# Patient Record
Sex: FEMALE | Race: WHITE | Employment: OTHER | URBAN - METROPOLITAN AREA
[De-identification: names, ages, dates, MRNs, and addresses within clinical notes are randomized per-mention and may not be internally consistent; named-entity substitution may affect disease eponyms.]

---

## 2017-03-01 DIAGNOSIS — J45.20 MILD INTERMITTENT ASTHMA WITHOUT COMPLICATION: ICD-10-CM

## 2017-03-02 NOTE — TELEPHONE ENCOUNTER
Spiriva   Last Written Prescription Date: 8/4/2016  Last Fill Quantity: 3 # refills:   Last Office Visit with [facility], [facility] or [facility] prescribing provider:  8/4/2016   Future Office Visit:       Date of Last Asthma Action Plan Letter:   Asthma Action Plan Q1 Year    Topic Date Due     Asthma Action Plan - yearly  05/08/2015      Asthma Control Test:   ACT Total Scores 8/5/2016   ACT TOTAL SCORE -   ASTHMA ER VISITS -   ASTHMA HOSPITALIZATIONS -   ACT TOTAL SCORE (Goal Greater than or Equal to 20) 24   In the past 12 months, how many times did you visit the emergency room for your asthma without being admitted to the hospital? 0   In the past 12 months, how many times were you hospitalized overnight because of your asthma? 0       Date of Last Spirometry Test:   No results found for this or any previous visit.

## 2017-03-03 RX ORDER — TIOTROPIUM BROMIDE 18 UG/1
CAPSULE ORAL; RESPIRATORY (INHALATION)
Qty: 90 CAPSULE | Refills: 0 | Status: SHIPPED | OUTPATIENT
Start: 2017-03-03 | End: 2017-05-09

## 2017-03-03 NOTE — TELEPHONE ENCOUNTER
Medication is being filled for 1 time refill only due to:  Patient needs to be seen because due for asthma f/u.  .   Reminder note sent to pharmacy    Santa Mchugh RN

## 2017-05-09 DIAGNOSIS — J45.20 MILD INTERMITTENT ASTHMA WITHOUT COMPLICATION: ICD-10-CM

## 2017-05-09 RX ORDER — TIOTROPIUM BROMIDE 18 UG/1
CAPSULE ORAL; RESPIRATORY (INHALATION)
Qty: 30 CAPSULE | Refills: 0 | Status: SHIPPED | OUTPATIENT
Start: 2017-05-09 | End: 2017-06-29

## 2017-05-09 NOTE — TELEPHONE ENCOUNTER
SPIRIVA 18 MCG CP-HANDIHALER       Last Written Prescription Date: 3/3/17  Last Fill Quantity: 90, # refills: 0    Last Office Visit with G, P or [facility] prescribing provider:  8/5/16   Future Office Visit:       Date of Last Asthma Action Plan Letter:   Asthma Action Plan Q1 Year    Topic Date Due     Asthma Action Plan - yearly  05/08/2015      Asthma Control Test:   ACT Total Scores 8/5/2016   ACT TOTAL SCORE -   ASTHMA ER VISITS -   ASTHMA HOSPITALIZATIONS -   ACT TOTAL SCORE (Goal Greater than or Equal to 20) 24   In the past 12 months, how many times did you visit the emergency room for your asthma without being admitted to the hospital? 0   In the past 12 months, how many times were you hospitalized overnight because of your asthma? 0       Date of Last Spirometry Test:   No results found for this or any previous visit.    Patient is going to [place] and won't be back until [place] 10.  Will need 30 days supply for coverage until return.  Will schedule an appt when she return to [place].   30 days supply sent as requested.    Santa Mchugh RN

## 2017-06-29 ENCOUNTER — OFFICE VISIT (OUTPATIENT)
Dept: FAMILY MEDICINE | Facility: CLINIC | Age: 82
End: 2017-06-29
Payer: COMMERCIAL

## 2017-06-29 VITALS
HEART RATE: 68 BPM | BODY MASS INDEX: 25.49 KG/M2 | HEIGHT: 61 IN | WEIGHT: 135 LBS | TEMPERATURE: 97.8 F | OXYGEN SATURATION: 98 % | SYSTOLIC BLOOD PRESSURE: 124 MMHG | RESPIRATION RATE: 16 BRPM | DIASTOLIC BLOOD PRESSURE: 70 MMHG

## 2017-06-29 DIAGNOSIS — M81.0 OSTEOPOROSIS, UNSPECIFIED OSTEOPOROSIS TYPE, UNSPECIFIED PATHOLOGICAL FRACTURE PRESENCE: ICD-10-CM

## 2017-06-29 DIAGNOSIS — I73.00 RAYNAUD'S DISEASE WITHOUT GANGRENE: ICD-10-CM

## 2017-06-29 DIAGNOSIS — J45.20 MILD INTERMITTENT ASTHMA WITHOUT COMPLICATION: Primary | ICD-10-CM

## 2017-06-29 PROCEDURE — 99213 OFFICE O/P EST LOW 20 MIN: CPT | Performed by: PHYSICIAN ASSISTANT

## 2017-06-29 RX ORDER — FLUTICASONE PROPIONATE 110 UG/1
2 AEROSOL, METERED RESPIRATORY (INHALATION) 2 TIMES DAILY
Qty: 3 INHALER | Refills: 1 | Status: SHIPPED | OUTPATIENT
Start: 2017-06-29 | End: 2017-10-05

## 2017-06-29 RX ORDER — TIOTROPIUM BROMIDE 18 UG/1
CAPSULE ORAL; RESPIRATORY (INHALATION)
Qty: 90 CAPSULE | Refills: 1 | Status: SHIPPED | OUTPATIENT
Start: 2017-06-29 | End: 2017-10-05

## 2017-06-29 RX ORDER — ALBUTEROL SULFATE 90 UG/1
2 AEROSOL, METERED RESPIRATORY (INHALATION) 4 TIMES DAILY PRN
Qty: 3 INHALER | Refills: 1 | Status: SHIPPED | OUTPATIENT
Start: 2017-06-29 | End: 2018-10-22

## 2017-06-29 RX ORDER — AMLODIPINE BESYLATE 2.5 MG/1
2.5 TABLET ORAL DAILY
Qty: 90 TABLET | Refills: 3 | Status: CANCELLED | OUTPATIENT
Start: 2017-06-29

## 2017-06-29 NOTE — PROGRESS NOTES
"Chief Complaint   Patient presents with     Recheck Medication     Asthma       Initial /70  Pulse 68  Temp 97.8  F (36.6  C)  Resp 16  Ht 5' 1\" (1.549 m)  Wt 135 lb (61.2 kg)  SpO2 98%  BMI 25.51 kg/m2 Estimated body mass index is 25.51 kg/(m^2) as calculated from the following:    Height as of this encounter: 5' 1\" (1.549 m).    Weight as of this encounter: 135 lb (61.2 kg).  Medication Reconciliation: complete. MEÑO Talbert LPN        SUBJECTIVE:                                                    Kylah Carrasquillo is a 81 year old female who presents to clinic today for the following health issues:      Asthma Follow-Up    Was ACT completed today?    Yes    ACT Total Scores 8/5/2016   ACT TOTAL SCORE -   ASTHMA ER VISITS -   ASTHMA HOSPITALIZATIONS -   ACT TOTAL SCORE (Goal Greater than or Equal to 20) 24   In the past 12 months, how many times did you visit the emergency room for your asthma without being admitted to the hospital? 0   In the past 12 months, how many times were you hospitalized overnight because of your asthma? 0         Asthma stable, requesting refills.     Has been traveling a lot - [place], [place], [place].       -------------------------------------    Problem list and histories reviewed & adjusted, as indicated.  Additional history: as documented    Patient Active Problem List   Diagnosis     Mild intermittent asthma     Cardiovascular disease     Raynaud's syndrome     Advanced directives, counseling/discussion     Osteoporosis     Hyperlipidemia LDL goal <100     First degree AV block     Cataracta     Hallux valgus, acquired     Arthritis of right foot     S/P hysterectomy     Overweight (BMI 25.0-29.9)     Past Surgical History:   Procedure Laterality Date     CYSTOCELE REPAIR  1970's     HEMORRHOIDECTOMY  1970's     HERNIA REPAIR, UMBILICAL  1970's     HYSTERECTOMY, VAGINAL  1988    menorrhagia     PHACOEMULSIFICATION CLEAR CORNEA WITH STANDARD INTRAOCULAR LENS IMPLANT  " 8/7/2014    Procedure: PHACOEMULSIFICATION CLEAR CORNEA WITH STANDARD INTRAOCULAR LENS IMPLANT;  Surgeon: Austin Garcia MD;  Location:  [facility]     PHACOEMULSIFICATION CLEAR CORNEA WITH STANDARD INTRAOCULAR LENS IMPLANT Left 9/25/2014    Procedure: PHACOEMULSIFICATION CLEAR CORNEA WITH STANDARD INTRAOCULAR LENS IMPLANT;  Surgeon: Austin Garcia MD;  Location:  [facility]     TONSILLECTOMY  1945     TUBAL LIGATION  1970's       Social History   Substance Use Topics     Smoking status: Former Smoker     Quit date: 11/15/1990     Smokeless tobacco: Never Used     Alcohol use 0.0 oz/week     0 Standard drinks or equivalent per week      Comment: occassional     Family History   Problem Relation Age of Onset     Asthma Mother      OSTEOPOROSIS Mother      C.A.D. Father      DIABETES Daughter      Thyroid Disease Daughter      C.A.D. Brother      C.A.D. Brother      Thyroid Disease Sister      DIABETES Brother      Hypertension No family hx of      Breast Cancer No family hx of      Cancer - colorectal No family hx of      Anesthesia Reaction No family hx of      Blood Disease No family hx of      Eye Disorder No family hx of          Current Outpatient Prescriptions   Medication Sig Dispense Refill     SPIRIVA HANDIHALER 18 MCG capsule INHALE THE CONTENTS OF 1 CAPSULE DAILY.NEEDS APPT FOR FURTHER REILLS 30 capsule 0     fluticasone (FLOVENT HFA) 110 MCG/ACT inhaler Inhale 2 puffs into the lungs 2 times daily 3 Inhaler 1     amLODIPine (NORVASC) 2.5 MG tablet Take 1 tablet (2.5 mg) by mouth daily 90 tablet 3     albuterol (VENTOLIN HFA) 108 (90 BASE) MCG/ACT inhaler Inhale 2 puffs into the lungs 4 times daily as needed 3 Inhaler 1     Omega-3 Fatty Acids (OMEGA-3 FISH OIL PO)        Multiple Vitamins-Minerals (MULTIVITAMIN PO)        Calcium Carbonate-Vitamin D (CALCIUM + D PO) Take 2 tablets by mouth daily.  0     Allergies   Allergen Reactions     Benadryl [Diphenhydramine Hcl]      Morphine      Penicillins   "    Labs reviewed in EPIC    Reviewed and updated as needed this visit by clinical staff  Tobacco  Allergies  Meds  Surg Hx  Fam Hx  Soc Hx      Reviewed and updated as needed this visit by Provider         Social History     Social History     Marital status: Single     Spouse name: N/A     Number of children: 7     Years of education: 12     Occupational History     Dentist's office      Banking      Social History Main Topics     Smoking status: Former Smoker     Quit date: 11/15/1990     Smokeless tobacco: Never Used     Alcohol use 0.0 oz/week     0 Standard drinks or equivalent per week      Comment: occassional     Drug use: No     Sexual activity: No     Other Topics Concern      Service No     Blood Transfusions No     Special Diet No     Back Care No     Exercise Yes     walking, not regularly; will start going to a health club     Seat Belt Yes     Self-Exams Yes     Parent/Sibling W/ Cabg, Mi Or Angioplasty Before 65f 55m? Yes     Social History Narrative    Eats fruits and vegetables every day. Calcium intake is good. Vitamin D intake is close.       10 point review of systems negative other than symptoms noted above.   Constitutional, HEENT, CV, pulmonary, GI, , MS, Endo, Psych systems are all negative, except as otherwise noted.       OBJECTIVE:  /70  Pulse 68  Temp 97.8  F (36.6  C)  Resp 16  Ht 5' 1\" (1.549 m)  Wt 135 lb (61.2 kg)  SpO2 98%  BMI 25.51 kg/m2  CONSTITUTIONAL: Alert, well-nourished, well-groomed, NAD  RESP: Lungs CTA. No wheeze, rhonchi, rales. Normal effort on room air. Equal lung sounds bilaterally.   CV: HRRR, normal S1, S2. No MRG. No peripheral edema.  DERM: No rashes or suspicious lesions  Head: Normocephalic, atraumatic.  Eyes: Conjunctiva clear, non icteric. PERRLA.  Ears: External ears and TMs normal BL.  Nose: Septum midline, nasal mucosa pink and moist. No discharge.  Mouth / Throat: Normal dentition.  No oral lesions. Pharynx non erythematous, " tonsils without hypertrophy.  Neck: Supple, no enlarged LN, trachea midline.      Diagnostic Tests:  ACT 22    ASSESSMENT/PLAN:  (J45.20) Mild intermittent asthma without complication  (primary encounter diagnosis)  Comment: stable. No concerns.   Px in August.   Plan: tiotropium (SPIRIVA HANDIHALER) 18 MCG capsule,        fluticasone (FLOVENT HFA) 110 MCG/ACT Inhaler,         albuterol (VENTOLIN HFA) 108 (90 BASE) MCG/ACT         Inhaler            (I73.00) Raynaud's disease without gangrene  Comment:   Plan: Refill norvasc in August.    Osteoporosis - declines further DEXA scans.      FOLLOW-UP: Routinely and sooner as needed.  The patient agrees with this assessment and plan and agrees to call or return to the clinic with any questions or concerns or if their condition worsens.    CAROLINE Duran, PA-C  [facility]

## 2017-06-29 NOTE — MR AVS SNAPSHOT
"              After Visit Summary   6/29/2017    Kylah Carrasquillo    MRN: [id number]           Patient Information     Date Of Birth          1935        Visit Information        Provider Department      6/29/2017 10:40 AM Rocio Mariano PA-C [facility] [place]        Today's Diagnoses     Mild intermittent asthma without complication    -  1    Raynaud's disease without gangrene        Osteoporosis, unspecified osteoporosis type, unspecified pathological fracture presence           Follow-ups after your visit        Follow-up notes from your care team     Return in about 2 months (around 8/29/2017) for Lab Work, Physical Exam, Medication Check.      Who to contact     If you have questions or need follow up information about today's clinic visit or your schedule please contact [facility] [place] directly at [phone].  Normal or non-critical lab and imaging results will be communicated to you by MyChart, letter or phone within 4 business days after the clinic has received the results. If you do not hear from us within 7 days, please contact the clinic through MyChart or phone. If you have a critical or abnormal lab result, we will notify you by phone as soon as possible.  Submit refill requests through Cloud Amenity or call your pharmacy and they will forward the refill request to us. Please allow 3 business days for your refill to be completed.          Additional Information About Your Visit        MyChart Information     Cloud Amenity lets you send messages to your doctor, view your test results, renew your prescriptions, schedule appointments and more. To sign up, go to [email or web address] . Click on \"Log in\" on the left side of the screen, which will take you to the Welcome page. Then click on \"Sign up Now\" on the right side of the page.     You will be asked to enter the access code listed below, as well as some personal information. Please follow the directions to create " "your username and password.     Your access code is: [email or web address]  Expires: 2017  2:31 PM     Your access code will  in 90 days. If you need help or a new code, please call your [facility] or [phone].        Care EveryWhere ID     This is your Care EveryWhere ID. This could be used by other organizations to access your [place] medical records  [id number]        Your Vitals Were     Pulse Temperature Respirations Height Pulse Oximetry BMI (Body Mass Index)    68 97.8  F (36.6  C) 16 5' 1\" (1.549 m) 98% 25.51 kg/m2       Blood Pressure from Last 3 Encounters:   17 124/70   16 124/72   02/15/16 138/70    Weight from Last 3 Encounters:   17 135 lb (61.2 kg)   16 135 lb (61.2 kg)   02/15/16 133 lb (60.3 kg)              Today, you had the following     No orders found for display         Today's Medication Changes          These changes are accurate as of: 17  2:31 PM.  If you have any questions, ask your nurse or doctor.               These medicines have changed or have updated prescriptions.        Dose/Directions    tiotropium 18 MCG capsule   Commonly known as:  SPIRIVA HANDIHALER   This may have changed:  See the new instructions.   Used for:  Mild intermittent asthma without complication   Changed by:  Rocio Mariano PA-C        INHALE THE CONTENTS OF 1 CAPSULE DAILY.   Quantity:  90 capsule   Refills:  1            Where to get your medicines      These medications were sent to [id number] [place], [place] - [address] [facility] [place]  [address] [address] [facility] [address]     Phone:  [phone]     albuterol 108 (90 BASE) MCG/ACT Inhaler    fluticasone 110 MCG/ACT Inhaler    tiotropium 18 MCG capsule                Primary Care Provider    None Specified       No primary provider on file.        Equal Access to Services     MARIA FERNANDA CORNELIUS AH: Shaila miguel Somichelle, waaxda luqadaha, qaybta kaalmarc deleon, erasmo vargas " mikeljohanna ulishae la'aan ah. So [facility] [phone].    ATENCIÓN: Si habla ginger, tiene a still disposición servicios gratuitos de asistencia lingüística. [place] [phone].    We comply with applicable federal civil rights laws and [place] laws. We do not discriminate on the basis of race, color, national origin, age, disability sex, sexual orientation or gender identity.            Thank you!     Thank you for choosing [facility]  for your [facility]. Our goal is always to provide you with excellent care. Hearing back from our patients is one way we can continue to improve our services. Please take a few minutes to complete the written survey that you may receive in the mail after your visit with us. Thank you!             Your Updated Medication List - Protect others around you: Learn how to safely use, store and throw away your medicines at www.disposemymeds.org.          This list is accurate as of: 6/29/17  2:31 PM.  Always use your most recent med list.                   Brand Name Dispense Instructions for use Diagnosis    albuterol 108 (90 BASE) MCG/ACT Inhaler    VENTOLIN HFA    3 Inhaler    Inhale 2 puffs into the lungs 4 times daily as needed    Mild intermittent asthma without complication       amLODIPine 2.5 MG tablet    NORVASC    90 tablet    Take 1 tablet (2.5 mg) by mouth daily    Raynaud's disease without gangrene       CALCIUM + D PO      Take 2 tablets by mouth daily.    Routine general medical examination at a health care facility       fluticasone 110 MCG/ACT Inhaler    FLOVENT HFA    3 Inhaler    Inhale 2 puffs into the lungs 2 times daily    Mild intermittent asthma without complication       MULTIVITAMIN PO           OMEGA-3 FISH OIL PO           tiotropium 18 MCG capsule    SPIRIVA HANDIHALER    90 capsule    INHALE THE CONTENTS OF 1 CAPSULE DAILY.    Mild intermittent asthma without complication

## 2017-06-30 ASSESSMENT — ASTHMA QUESTIONNAIRES: ACT_TOTALSCORE: 22

## 2017-09-26 DIAGNOSIS — I73.00 RAYNAUD'S DISEASE WITHOUT GANGRENE: ICD-10-CM

## 2017-09-26 RX ORDER — AMLODIPINE BESYLATE 2.5 MG/1
TABLET ORAL
Qty: 30 TABLET | Refills: 0 | Status: SHIPPED | OUTPATIENT
Start: 2017-09-26 | End: 2017-10-05

## 2017-09-26 NOTE — TELEPHONE ENCOUNTER
Norvasc      Last Written Prescription Date: 8/4/16  Last Fill Quantity: 90, # refills: 3    Last Office Visit with G, P or [facility] prescribing provider:  6/29/17   Future Office Visit:        BP Readings from Last 3 Encounters:   06/29/17 124/70   08/04/16 124/72   02/15/16 138/70     MEÑO Talbert LPN

## 2017-09-26 NOTE — TELEPHONE ENCOUNTER
Routing refill request to provider for review/approval because:  HTN not addressed in 1 year.   Mercedes Brooks RN - Triage  [facility]

## 2017-10-05 ENCOUNTER — OFFICE VISIT (OUTPATIENT)
Dept: FAMILY MEDICINE | Facility: CLINIC | Age: 82
End: 2017-10-05
Payer: COMMERCIAL

## 2017-10-05 VITALS
TEMPERATURE: 97.9 F | BODY MASS INDEX: 24.92 KG/M2 | DIASTOLIC BLOOD PRESSURE: 68 MMHG | RESPIRATION RATE: 16 BRPM | SYSTOLIC BLOOD PRESSURE: 128 MMHG | HEART RATE: 76 BPM | OXYGEN SATURATION: 99 % | HEIGHT: 61 IN | WEIGHT: 132 LBS

## 2017-10-05 DIAGNOSIS — I73.00 RAYNAUD'S DISEASE WITHOUT GANGRENE: ICD-10-CM

## 2017-10-05 DIAGNOSIS — Z00.00 LABORATORY EXAMINATION ORDERED AS PART OF A ROUTINE GENERAL MEDICAL EXAMINATION: ICD-10-CM

## 2017-10-05 DIAGNOSIS — Z00.00 ENCOUNTER FOR ROUTINE ADULT HEALTH EXAMINATION WITHOUT ABNORMAL FINDINGS: Primary | ICD-10-CM

## 2017-10-05 DIAGNOSIS — Z23 NEED FOR PROPHYLACTIC VACCINATION AND INOCULATION AGAINST INFLUENZA: ICD-10-CM

## 2017-10-05 DIAGNOSIS — J45.20 MILD INTERMITTENT ASTHMA WITHOUT COMPLICATION: ICD-10-CM

## 2017-10-05 PROCEDURE — 90662 IIV NO PRSV INCREASED AG IM: CPT | Performed by: PHYSICIAN ASSISTANT

## 2017-10-05 PROCEDURE — 36415 COLL VENOUS BLD VENIPUNCTURE: CPT | Performed by: PHYSICIAN ASSISTANT

## 2017-10-05 PROCEDURE — 80048 BASIC METABOLIC PNL TOTAL CA: CPT | Performed by: PHYSICIAN ASSISTANT

## 2017-10-05 PROCEDURE — 99397 PER PM REEVAL EST PAT 65+ YR: CPT | Mod: 25 | Performed by: PHYSICIAN ASSISTANT

## 2017-10-05 PROCEDURE — G0008 ADMIN INFLUENZA VIRUS VAC: HCPCS | Performed by: PHYSICIAN ASSISTANT

## 2017-10-05 PROCEDURE — 80061 LIPID PANEL: CPT | Performed by: PHYSICIAN ASSISTANT

## 2017-10-05 RX ORDER — FLUTICASONE PROPIONATE 110 UG/1
2 AEROSOL, METERED RESPIRATORY (INHALATION) 2 TIMES DAILY
Qty: 3 INHALER | Refills: 3 | Status: SHIPPED | OUTPATIENT
Start: 2017-10-05 | End: 2018-10-22

## 2017-10-05 RX ORDER — AMLODIPINE BESYLATE 2.5 MG/1
2.5 TABLET ORAL DAILY
Qty: 90 TABLET | Refills: 3 | Status: SHIPPED | OUTPATIENT
Start: 2017-10-05 | End: 2018-10-22

## 2017-10-05 RX ORDER — TIOTROPIUM BROMIDE 18 UG/1
CAPSULE ORAL; RESPIRATORY (INHALATION)
Qty: 90 CAPSULE | Refills: 3 | Status: SHIPPED | OUTPATIENT
Start: 2017-10-05 | End: 2018-10-22

## 2017-10-05 NOTE — MR AVS SNAPSHOT
After Visit Summary   10/5/2017    Kylah Carrasquillo    MRN: [id number]           Patient Information     Date Of Birth          1935        Visit Information        Provider Department      10/5/2017 10:20 AM Rocio Mariano PA-C [facility]        Today's Diagnoses     Laboratory examination ordered as part of a routine general medical examination    -  1    Raynaud's disease without gangrene        Mild intermittent asthma without complication          Care Instructions      Preventive Health Recommendations  Female Ages 65 +    Yearly exam:     See your health care provider every year in order to  o Review health changes.   o Discuss preventive care.    o Review your medicines if your doctor has prescribed any.      You no longer need a yearly Pap test unless you've had an abnormal Pap test in the past 10 years. If you have vaginal symptoms, such as bleeding or discharge, be sure to talk with your provider about a Pap test.      Every 1 to 2 years, have a mammogram.  If you are over 69, talk with your health care provider about whether or not you want to continue having screening mammograms.      Every 10 years, have a colonoscopy. Or, have a yearly FIT test (stool test). These exams will check for colon cancer.       Have a cholesterol test every 5 years, or more often if your doctor advises it.       Have a diabetes test (fasting glucose) every three years. If you are at risk for diabetes, you should have this test more often.       At age 65, have a bone density scan (DEXA) to check for osteoporosis (brittle bone disease).    Shots:    Get a flu shot each year.    Get a tetanus shot every 10 years.    Talk to your doctor about your pneumonia vaccines. There are now two you should receive - Pneumovax (PPSV 23) and Prevnar (PCV 13).    Talk to your doctor about the shingles vaccine.    Talk to your doctor about the hepatitis B vaccine.    Nutrition:     Eat at  "least 5 servings of fruits and vegetables each day.      Eat whole-grain bread, whole-wheat pasta and brown rice instead of white grains and rice.      Talk to your provider about Calcium and Vitamin D.     Lifestyle    Exercise at least 150 minutes a week (30 minutes a day, 5 days a week). This will help you control your weight and prevent disease.      Limit alcohol to one drink per day.      No smoking.       Wear sunscreen to prevent skin cancer.       See your dentist twice a year for an exam and cleaning.      See your eye doctor every 1 to 2 years to screen for conditions such as glaucoma, macular degeneration, cataracts, etc           Follow-ups after your visit        Who to contact     If you have questions or need follow up information about today's clinic visit or your schedule please contact [facility] [place] directly at [phone].  Normal or non-critical lab and imaging results will be communicated to you by QuickPayhart, letter or phone within 4 business days after the clinic has received the results. If you do not hear from us within 7 days, please contact the clinic through QuickPayhart or phone. If you have a critical or abnormal lab result, we will notify you by phone as soon as possible.  Submit refill requests through BigString or call your pharmacy and they will forward the refill request to us. Please allow 3 business days for your refill to be completed.          Additional Information About Your Visit        BigString Information     BigString lets you send messages to your doctor, view your test results, renew your prescriptions, schedule appointments and more. To sign up, go to [email or web address] . Click on \"Log in\" on the left side of the screen, which will take you to the Welcome page. Then click on \"Sign up Now\" on the right side of the page.     You will be asked to enter the access code listed below, as well as some personal information. Please follow the directions to create " "your username and password.     Your access code is: [email or web address]  Expires: 1/3/2018 10:45 AM     Your access code will  in 90 days. If you need help or a new code, please call your [place] clinic or [phone].        Care EveryWhere ID     This is your Care EveryWhere ID. This could be used by other organizations to access your [place] medical records  [id number]        Your Vitals Were     Pulse Temperature Respirations Height Pulse Oximetry BMI (Body Mass Index)    76 97.9  F (36.6  C) 16 5' 1\" (1.549 m) 99% 24.94 kg/m2       Blood Pressure from Last 3 Encounters:   10/05/17 128/68   17 124/70   16 124/72    Weight from Last 3 Encounters:   10/05/17 132 lb (59.9 kg)   17 135 lb (61.2 kg)   16 135 lb (61.2 kg)              We Performed the Following     Basic metabolic panel  (Ca, Cl, CO2, Creat, Gluc, K, Na, BUN)     Lipid panel reflex to direct LDL          Where to get your medicines      These medications were sent to [id number] - [place], [place] - [address]  [address] [address] [facility] [place] [facility] [place] [address]     Phone:  [phone]     amLODIPine 2.5 MG tablet    fluticasone 110 MCG/ACT Inhaler    tiotropium 18 MCG capsule          Primary Care Provider Office Phone # Fax #    Rocio Mariano PA-C [phone]       [address]  [place] [address]        Equal Access to Services     MARIA FERNANDA CORNELIUS : Hadii marsha miguel Somichelle, waaxda luqadaha, qaybta kaalmaerasmo haley. So [facility] [phone].    ATENCIÓN: Si habla español, tiene a still disposición servicios gratuitos de asistencia lingüística. Edmund al [phone].    We comply with applicable federal civil rights laws and [place] laws. We do not discriminate on the basis of race, color, national origin, age, disability, sex, sexual orientation, or gender identity.            Thank you!     Thank you for choosing [facility] [place]  for your care. Our goal is always to provide you with excellent care. Hearing back from our patients is one way we can continue to improve our services. Please take a few minutes to complete the written survey that you may receive in the mail after your visit with us. Thank you!             Your Updated Medication List - Protect others around you: Learn how to safely use, store and throw away your medicines at www.disposemymeds.org.          This list is accurate as of: 10/5/17 10:45 AM.  Always use your most recent med list.                   Brand Name Dispense Instructions for use Diagnosis    albuterol 108 (90 BASE) MCG/ACT Inhaler    VENTOLIN HFA    3 Inhaler    Inhale 2 puffs into the lungs 4 times daily as needed    Mild intermittent asthma without complication       amLODIPine 2.5 MG tablet    NORVASC    90 tablet    Take 1 tablet (2.5 mg) by mouth daily    Raynaud's disease without gangrene       CALCIUM + D PO      Take 2 tablets by mouth daily.    Routine general medical examination at a health care facility       fluticasone 110 MCG/ACT Inhaler    FLOVENT HFA    3 Inhaler    Inhale 2 puffs into the lungs 2 times daily    Mild intermittent asthma without complication       MULTIVITAMIN PO           OMEGA-3 FISH OIL PO           tiotropium 18 MCG capsule    SPIRIVA HANDIHALER    90 capsule    INHALE THE CONTENTS OF 1 CAPSULE DAILY.    Mild intermittent asthma without complication

## 2017-10-05 NOTE — LETTER
October 6, 2017      KylahBijan Carrasquillo  [address] [facility] [address]      Mark Montero,    I have reviewed your recent labs. Here are the results:    -Kidney function is normal (Cr, GFR), Sodium is normal, Potassium is normal, Calcium is normal, Glucose is normal (diabetes screening test).   -Cholesterol levels (LDL,HDL, Triglycerides) are stable.  ADVISE: rechecking in 1 year.    If you have any questions please do not hesitate to contact our office via phone ([phone]) or garbs by clicking the contact my Care Team link.    If you have further questions about the interpretation of your lab results, [email or web address] is a great website to check out.    Thank you for allowing me to participate in your care!    CAROLINE Diaz, PAAlisaC  [facility]

## 2017-10-05 NOTE — PROGRESS NOTES
"Chief Complaint   Patient presents with     Physical       Initial /68  Pulse 76  Temp 97.9  F (36.6  C)  Resp 16  Ht 5' 1\" (1.549 m)  Wt 132 lb (59.9 kg)  SpO2 99%  BMI 24.94 kg/m2 Estimated body mass index is 24.94 kg/(m^2) as calculated from the following:    Height as of this encounter: 5' 1\" (1.549 m).    Weight as of this encounter: 132 lb (59.9 kg).  Medication Reconciliation: complete. MEÑO Talbert LPN      SUBJECTIVE:   Kylah Carrasquillo is a 81 year old female who presents for Preventive Visit.    Are you in the first 12 months of your Medicare Part B coverage?  No    Healthy Habits:    Do you get at least three servings of calcium containing foods daily (dairy, green leafy vegetables, etc.)? yes and no, taking calcium and/or vitamin D supplement: yes -     Amount of exercise or daily activities, outside of work: 1 day(s) per week    Problems taking medications regularly No    Medication side effects: No    Have you had an eye exam in the past two years? yes    Do you see a dentist twice per year? yes    Do you have sleep apnea, excessive snoring or daytime drowsiness?no    COGNITIVE SCREEN  1) Repeat 3 items (Banana, Sunrise, Chair)    2) Clock draw: NORMAL  3) 3 item recall: Recalls 3 objects  Results: 3 items recalled: COGNITIVE IMPAIRMENT LESS LIKELY    Mini-CogTM Copyright S Kane. Licensed by the author for use in [facility]; reprinted with permission (moe@[facility]). All rights reserved.          -------------------------------------    Reviewed and updated as needed this visit by clinical staffTobacco  Allergies  Meds  Fam Hx  Soc Hx        Reviewed and updated as needed this visit by Provider        Social History   Substance Use Topics     Smoking status: Former Smoker     Quit date: 11/15/1990     Smokeless tobacco: Never Used     Alcohol use 0.0 oz/week     0 Standard drinks or equivalent per week      Comment: occassional       The patient does not drink >3 drinks " per day nor >7 drinks per week.    Today's PHQ-2 Score:   PHQ-2 ( 1999 Pfizer) 10/5/2017 6/29/2017   Q1: Little interest or pleasure in doing things 0 0   Q2: Feeling down, depressed or hopeless 0 0   PHQ-2 Score 0 0         Do you feel safe in your environment - Yes    Do you have a Health Care Directive?: Yes: Patient states has Advance Directive and will bring in a copy to clinic.    Current providers sharing in care for this patient include: Patient Care Team:  Rocio Mariano PA-C as PCP - General (Physician Assistant)      Hearing impairment: Yes, - has hearing aids - does not wear     Ability to successfully perform activities of daily living: Yes, no assistance needed     Fall risk:         Home safety:  none identified      The following health maintenance items are reviewed in Epic and correct as of today:Health Maintenance   Topic Date Due     ASTHMA ACTION PLAN Q1 YR  05/08/2015     ADVANCE DIRECTIVE PLANNING Q5 YRS  12/12/2016     FALL RISK ASSESSMENT  08/04/2017     INFLUENZA VACCINE (SYSTEM ASSIGNED)  09/01/2017     TETANUS IMMUNIZATION (SYSTEM ASSIGNED)  10/19/2017     ASTHMA CONTROL TEST Q6 MOS  12/29/2017     DEXA SCAN SCREENING (SYSTEM ASSIGNED)  Completed       MEÑO Talbert LPN      Labs reviewed in EPIC  BP Readings from Last 3 Encounters:   10/05/17 128/68   06/29/17 124/70   08/04/16 124/72    Wt Readings from Last 3 Encounters:   10/05/17 132 lb (59.9 kg)   06/29/17 135 lb (61.2 kg)   08/04/16 135 lb (61.2 kg)                  Patient Active Problem List   Diagnosis     Mild intermittent asthma     Cardiovascular disease     Raynaud's syndrome     Advanced directives, counseling/discussion     Osteoporosis     Hyperlipidemia LDL goal <100     First degree AV block     Cataracta     Hallux valgus, acquired     Arthritis of right foot     S/P hysterectomy     Overweight (BMI 25.0-29.9)     Past Surgical History:   Procedure Laterality Date     CYSTOCELE REPAIR  1970's      HEMORRHOIDECTOMY  1970's     HERNIA REPAIR, UMBILICAL  1970's     HYSTERECTOMY, VAGINAL  1988    menorrhagia     PHACOEMULSIFICATION CLEAR CORNEA WITH STANDARD INTRAOCULAR LENS IMPLANT  8/7/2014    Procedure: PHACOEMULSIFICATION CLEAR CORNEA WITH STANDARD INTRAOCULAR LENS IMPLANT;  Surgeon: Austin Garcia MD;  [facility]     PHACOEMULSIFICATION CLEAR CORNEA WITH STANDARD INTRAOCULAR LENS IMPLANT Left 9/25/2014    Procedure: PHACOEMULSIFICATION CLEAR CORNEA WITH STANDARD INTRAOCULAR LENS IMPLANT;  Surgeon: Austin Garcia MD;  Location:  [facility]     TONSILLECTOMY  1945     TUBAL LIGATION  1970's       Social History   Substance Use Topics     Smoking status: Former Smoker     Quit date: 11/15/1990     Smokeless tobacco: Never Used     Alcohol use 0.0 oz/week     0 Standard drinks or equivalent per week      Comment: occassional     Family History   Problem Relation Age of Onset     Asthma Mother      OSTEOPOROSIS Mother      C.A.D. Father      DIABETES Daughter      Thyroid Disease Daughter      C.A.D. Brother      C.A.D. Brother      Thyroid Disease Sister      DIABETES Brother      Hypertension No family hx of      Breast Cancer No family hx of      Cancer - colorectal No family hx of      Anesthesia Reaction No family hx of      Blood Disease No family hx of      Eye Disorder No family hx of          Current Outpatient Prescriptions   Medication Sig Dispense Refill     amLODIPine (NORVASC) 2.5 MG tablet Take 1 tablet (2.5 mg) by mouth daily 90 tablet 3     tiotropium (SPIRIVA HANDIHALER) 18 MCG capsule INHALE THE CONTENTS OF 1 CAPSULE DAILY. 90 capsule 3     fluticasone (FLOVENT HFA) 110 MCG/ACT Inhaler Inhale 2 puffs into the lungs 2 times daily 3 Inhaler 3     albuterol (VENTOLIN HFA) 108 (90 BASE) MCG/ACT Inhaler Inhale 2 puffs into the lungs 4 times daily as needed 3 Inhaler 1     Omega-3 Fatty Acids (OMEGA-3 FISH OIL PO)        Multiple Vitamins-Minerals (MULTIVITAMIN PO)        Calcium  "Carbonate-Vitamin D (CALCIUM + D PO) Take 2 tablets by mouth daily.  0     [DISCONTINUED] amLODIPine (NORVASC) 2.5 MG tablet TAKE 1 TABLET (2.5 MG) BYMOUTH DAILY (Patient not taking: Reported on 10/5/2017) 30 tablet 0     [DISCONTINUED] tiotropium (SPIRIVA HANDIHALER) 18 MCG capsule INHALE THE CONTENTS OF 1 CAPSULE DAILY. 90 capsule 1     [DISCONTINUED] fluticasone (FLOVENT HFA) 110 MCG/ACT Inhaler Inhale 2 puffs into the lungs 2 times daily 3 Inhaler 1     [DISCONTINUED] amLODIPine (NORVASC) 2.5 MG tablet Take 1 tablet (2.5 mg) by mouth daily 90 tablet 3     Allergies   Allergen Reactions     Benadryl [Diphenhydramine Hcl]      Morphine      Penicillins            Pneumonia Vaccine:complete  Mammogram Screening: declines.     ROS:  C: NEGATIVE for fever, chills, change in weight  I: NEGATIVE for worrisome rashes, moles or lesions  E: NEGATIVE for vision changes or irritation  E/M: NEGATIVE for ear, mouth and throat problems  R: NEGATIVE for significant cough or SOB  B: NEGATIVE for masses, tenderness or discharge  CV: NEGATIVE for chest pain, palpitations or peripheral edema  GI: NEGATIVE for nausea, abdominal pain, heartburn, or change in bowel habits  : NEGATIVE for frequency, dysuria, or hematuria  M: NEGATIVE for significant arthralgias or myalgia  N: NEGATIVE for weakness, dizziness or paresthesias  E: NEGATIVE for temperature intolerance, skin/hair changes  H: NEGATIVE for bleeding problems  P: NEGATIVE for changes in mood or affect    OBJECTIVE:   /68  Pulse 76  Temp 97.9  F (36.6  C)  Resp 16  Ht 5' 1\" (1.549 m)  Wt 132 lb (59.9 kg)  SpO2 99%  BMI 24.94 kg/m2 Estimated body mass index is 24.94 kg/(m^2) as calculated from the following:    Height as of this encounter: 5' 1\" (1.549 m).    Weight as of this encounter: 132 lb (59.9 kg).  EXAM:   GENERAL APPEARANCE: healthy, alert and no distress  EYES: Eyes grossly normal to inspection, PERRL and conjunctivae and sclerae normal  HENT: ear canals " and TM's normal, nose and mouth without ulcers or lesions, oropharynx clear and oral mucous membranes moist  NECK: no adenopathy, no asymmetry, masses, or scars and thyroid normal to palpation  RESP: lungs clear to auscultation - no rales, rhonchi or wheezes  BREAST: normal without masses, tenderness or nipple discharge and no palpable axillary masses or adenopathy  CV: regular rate and rhythm, normal S1 S2, no S3 or S4, no murmur, click or rub, no peripheral edema and peripheral pulses strong  ABDOMEN: soft, nontender, no hepatosplenomegaly, no masses and bowel sounds normal  MS: no musculoskeletal defects are noted and gait is age appropriate without ataxia  SKIN: no suspicious lesions or rashes  NEURO: Normal strength and tone, sensory exam grossly normal, mentation intact and speech normal  PSYCH: mentation appears normal and affect normal/bright    ASSESSMENT / PLAN:   Kylah was seen today for physical.    Diagnoses and all orders for this visit:    Encounter for routine adult health examination without abnormal findings    Laboratory examination ordered as part of a routine general medical examination  -     Basic metabolic panel  (Ca, Cl, CO2, Creat, Gluc, K, Na, BUN)  -     Lipid panel reflex to direct LDL    Raynaud's disease without gangrene  -     amLODIPine (NORVASC) 2.5 MG tablet; Take 1 tablet (2.5 mg) by mouth daily    Mild intermittent asthma without complication  -     tiotropium (SPIRIVA HANDIHALER) 18 MCG capsule; INHALE THE CONTENTS OF 1 CAPSULE DAILY.  -     fluticasone (FLOVENT HFA) 110 MCG/ACT Inhaler; Inhale 2 puffs into the lungs 2 times daily    Need for prophylactic vaccination and inoculation against influenza  -     FLU VACCINE, INCREASED ANTIGEN, PRESV FREE, AGE 65+ [38935]  -     ADMIN INFLUENZA (For MEDICARE Patients ONLY) []        End of Life Planning:  Patient currently has an advanced directive: No.  I have verified the patient's ablity to prepare an advanced directive/make  "health care decisions.  Literature was provided to assist patient in preparing an advanced directive.    COUNSELING:  Reviewed preventive health counseling, as reflected in patient instructions  Special attention given to:       Regular exercise       Healthy diet/nutrition       Vision screening       Dental care       Osteoporosis Prevention/Bone Health      BP Screening:   Last 3 BP Readings:    BP Readings from Last 3 Encounters:   10/05/17 128/68   06/29/17 124/70   08/04/16 124/72       The following was recommended to the patient:  Re-screen BP within a year and recommended lifestyle modifications  Estimated body mass index is 24.94 kg/(m^2) as calculated from the following:    Height as of this encounter: 5' 1\" (1.549 m).    Weight as of this encounter: 132 lb (59.9 kg).     reports that she quit smoking about 26 years ago. She has never used smokeless tobacco.        Appropriate preventive services were discussed with this patient, including applicable screening as appropriate for cardiovascular disease, diabetes, osteopenia/osteoporosis, and glaucoma.  As appropriate for age/gender, discussed screening for colorectal cancer, prostate cancer, breast cancer, and cervical cancer. Checklist reviewing preventive services available has been given to the patient.    Reviewed patients plan of care and provided an AVS. The Basic Care Plan (routine screening as documented in Health Maintenance) for Kylah meets the Care Plan requirement. This Care Plan has been established and reviewed with the Patient.    Counseling Resources:  ATP IV Guidelines  Pooled Cohorts Equation Calculator  Breast Cancer Risk Calculator  FRAX Risk Assessment  ICSI Preventive Guidelines  Dietary Guidelines for Americans, 2010  USDA's MyPlate  ASA Prophylaxis  Lung CA Screening    Rocio Mariano PA-C  [facility]3] [place] Influ[place] Immunization Documentation    1.  Is the person to be vaccinated sick today?   " No    2. Does the person to be vaccinated have an allergy to a component   of the vaccine?   No    3. Has the person to be vaccinated ever had a serious reaction   to influenza vaccine in the past?   No    4. Has the person to be vaccinated ever had Guillain-Barré syndrome?   No    Form completed by MEÑO Talbert LPN

## 2017-10-06 LAB
ANION GAP SERPL CALCULATED.3IONS-SCNC: 12 MMOL/L (ref 3–14)
BUN SERPL-MCNC: 18 MG/DL (ref 7–30)
CALCIUM SERPL-MCNC: 9.1 MG/DL (ref 8.5–10.1)
CHLORIDE SERPL-SCNC: 108 MMOL/L (ref 94–109)
CHOLEST SERPL-MCNC: 207 MG/DL
CO2 SERPL-SCNC: 21 MMOL/L (ref 20–32)
CREAT SERPL-MCNC: 0.76 MG/DL (ref 0.52–1.04)
GFR SERPL CREATININE-BSD FRML MDRD: 73 ML/MIN/1.7M2
GLUCOSE SERPL-MCNC: 91 MG/DL (ref 70–99)
HDLC SERPL-MCNC: 60 MG/DL
LDLC SERPL CALC-MCNC: 131 MG/DL
NONHDLC SERPL-MCNC: 147 MG/DL
POTASSIUM SERPL-SCNC: 4 MMOL/L (ref 3.4–5.3)
SODIUM SERPL-SCNC: 141 MMOL/L (ref 133–144)
TRIGL SERPL-MCNC: 82 MG/DL

## 2017-10-06 ASSESSMENT — ASTHMA QUESTIONNAIRES: ACT_TOTALSCORE: 23

## 2017-10-08 PROCEDURE — 82274 ASSAY TEST FOR BLOOD FECAL: CPT | Performed by: PHYSICIAN ASSISTANT

## 2017-10-12 DIAGNOSIS — Z12.11 COLON CANCER SCREENING: ICD-10-CM

## 2017-10-12 LAB — HEMOCCULT STL QL IA: NEGATIVE

## 2018-05-14 ENCOUNTER — OFFICE VISIT (OUTPATIENT)
Dept: FAMILY MEDICINE | Facility: CLINIC | Age: 83
End: 2018-05-14
Payer: COMMERCIAL

## 2018-05-14 VITALS
BODY MASS INDEX: 25.19 KG/M2 | DIASTOLIC BLOOD PRESSURE: 81 MMHG | TEMPERATURE: 97.7 F | WEIGHT: 133.4 LBS | SYSTOLIC BLOOD PRESSURE: 122 MMHG | RESPIRATION RATE: 14 BRPM | OXYGEN SATURATION: 96 % | HEART RATE: 76 BPM | HEIGHT: 61 IN

## 2018-05-14 DIAGNOSIS — J06.9 UPPER RESPIRATORY TRACT INFECTION, UNSPECIFIED TYPE: Primary | ICD-10-CM

## 2018-05-14 PROCEDURE — 99213 OFFICE O/P EST LOW 20 MIN: CPT | Performed by: PHYSICIAN ASSISTANT

## 2018-05-14 RX ORDER — BENZONATATE 100 MG/1
100 CAPSULE ORAL 3 TIMES DAILY PRN
Qty: 30 CAPSULE | Refills: 0 | Status: SHIPPED | OUTPATIENT
Start: 2018-05-14 | End: 2018-10-22

## 2018-05-14 NOTE — MR AVS SNAPSHOT
"              After Visit Summary   2018    Kylah Carrasquillo    MRN: [id number]           Patient Information     Date Of Birth          1935        Visit Information        Provider Department      2018 8:40 AM Abiodun Myers PA-C [facility] [place]        Today's Diagnoses     Upper respiratory tract infection, unspecified type    -  1       Follow-ups after your visit        Follow-up notes from your care team     Return in about 2 weeks (around 2018) for if new or worsening symptoms.      Who to contact     If you have questions or need follow up information about today's clinic visit or your schedule please contact [facility] directly at [phone].  Normal or non-critical lab and imaging results will be communicated to you by fring Ltd[employer], letter or phone within 4 business days after the clinic has received the results. If you do not hear from us within 7 days, please contact the clinic through MyChart or phone. If you have a critical or abnormal lab result, we will notify you by phone as soon as possible.  Submit refill requests through [employer] or call your pharmacy and they will forward the refill request to us. Please allow 3 business days for your refill to be completed.          Additional Information About Your Visit        MyChart Information     [employer] lets you send messages to your doctor, view your test results, renew your prescriptions, schedule appointments and more. To sign up, go to [email or web address] . Click on \"Log in\" on the left side of the screen, which will take you to the Welcome page. Then click on \"Sign up Now\" on the right side of the page.     You will be asked to enter the access code listed below, as well as some personal information. Please follow the directions to create your username and password.     Your access code is: [email or web address]  Expires: 2018  9:14 AM     Your access code will  in 90 days. If you " "need help or a new code, please call your [facility] clinic or [phone].        Care EveryWhere ID     This is your Care EveryWhere ID. This could be used by other organizations to access your [facility] medical records  [id number]        Your Vitals Were     Pulse Temperature Respirations Height Pulse Oximetry BMI (Body Mass Index)    76 97.7  F (36.5  C) (Tympanic) 14 5' 1\" (1.549 m) 96% 25.21 kg/m2       Blood Pressure from Last 3 Encounters:   05/14/18 122/81   10/05/17 128/68   06/29/17 124/70    Weight from Last 3 Encounters:   05/14/18 133 lb 6.4 oz (60.5 kg)   10/05/17 132 lb (59.9 kg)   06/29/17 135 lb (61.2 kg)              Today, you had the following     No orders found for display         Today's Medication Changes          These changes are accurate as of 5/14/18  9:14 AM.  If you have any questions, ask your nurse or doctor.               Start taking these medicines.        Dose/Directions    benzonatate 100 MG capsule   Commonly known as:  TESSALON   Used for:  Upper respiratory tract infection, unspecified type   Started by:  Abiodun Myers PA-C        Dose:  100 mg   Take 1 capsule (100 mg) by mouth 3 times daily as needed   Quantity:  30 capsule   Refills:  0            Where to get your medicines      These medications were sent to [id number] - [place], [place] - [address]  [address] [place] [address]     Phone:  [phone]     benzonatate 100 MG capsule                Primary Care Provider Office Phone # Fax #    Abiodun Myers PA-C [phone]       [address]  [place] [address]        Equal Access to Services     FAUSTO CORNELIUS : Shaila Greer, watyronda luqadaha, qaybta kaalmada adejohannayada, erasmo maldonado. So [facility] [phone].    ATENCIÓN: Si habla español, tiene a still disposición servicios gratuitos de asistencia lingüística. Llame al [phone].    We comply " with applicable federal civil rights laws and [place] laws. We do not discriminate on the basis of race, color, national origin, age, disability, sex, sexual orientation, or gender identity.            Thank you!     Thank you for choosing [facility] [place]  for your care. Our goal is always to provide you with excellent care. Hearing back from our patients is one way we can continue to improve our services. Please take a few minutes to complete the written survey that you may receive in the mail after your visit with us. Thank you!             Your Updated Medication List - Protect others around you: Learn how to safely use, store and throw away your medicines at www.disposemymeds.org.          This list is accurate as of 5/14/18  9:14 AM.  Always use your most recent med list.                   Brand Name Dispense Instructions for use Diagnosis    albuterol 108 (90 Base) MCG/ACT Inhaler    VENTOLIN HFA    3 Inhaler    Inhale 2 puffs into the lungs 4 times daily as needed    Mild intermittent asthma without complication       amLODIPine 2.5 MG tablet    NORVASC    90 tablet    Take 1 tablet (2.5 mg) by mouth daily    Raynaud's disease without gangrene       benzonatate 100 MG capsule    TESSALON    30 capsule    Take 1 capsule (100 mg) by mouth 3 times daily as needed    Upper respiratory tract infection, unspecified type       CALCIUM + D PO      Take 2 tablets by mouth daily.    Routine general medical examination at a health care facility       fluticasone 110 MCG/ACT Inhaler    FLOVENT HFA    3 Inhaler    Inhale 2 puffs into the lungs 2 times daily    Mild intermittent asthma without complication       MULTIVITAMIN PO           OMEGA-3 FISH OIL PO           tiotropium 18 MCG capsule    SPIRIVA HANDIHALER    90 capsule    INHALE THE CONTENTS OF 1 CAPSULE DAILY.    Mild intermittent asthma without complication

## 2018-05-14 NOTE — PROGRESS NOTES
SUBJECTIVE:   Kylah Carrasquillo is a 82 year old female who presents to clinic today for the following health issues:    Acute Illness   Acute illness concerns: Started out with body aches, fatigue, then started some coughing. Felt better Friday then started a cough on Saturday.  Onset: x 5 days    Fever: no     Chills/Sweats: YES- a little bit    Headache (location?): YES    Sinus Pressure:NO    Conjunctivitis:  no    Ear Pain: no    Rhinorrhea: no     Congestion: YES    Sore Throat: no     Cough: YES    Wheeze: YES    Decreased Appetite: YES    Nausea: no     Vomiting: no     Diarrhea:  no    Dysuria/Freq:  No    Fatigue/Achiness: YES    Sick/Strep Exposure: not sure     Therapies Tried and outcome: metamucil       Symptoms began with cold symptoms 5 days ago, fatigue and myalgias have improved, but she she has a productive cough x 2 days.  She has hx of asthma, notes mild wheezing, no SOB, CP or fevers.   She is using Flovent and Spiriva daily with good effect, has not been needing albuterol.         Problem list and histories reviewed & adjusted, as indicated.  Additional history: as documented    Patient Active Problem List   Diagnosis     Mild intermittent asthma     Cardiovascular disease     Raynaud's syndrome     Advanced directives, counseling/discussion     Osteoporosis     Hyperlipidemia LDL goal <100     First degree AV block     Cataracta     Hallux valgus, acquired     Arthritis of right foot     S/P hysterectomy     Overweight (BMI 25.0-29.9)     Past Surgical History:   Procedure Laterality Date     CYSTOCELE REPAIR  1970's     HEMORRHOIDECTOMY  1970's     HERNIA REPAIR, UMBILICAL  1970's     HYSTERECTOMY, VAGINAL  1988    menorrhagia     PHACOEMULSIFICATION CLEAR CORNEA WITH STANDARD INTRAOCULAR LENS IMPLANT  8/7/2014    Procedure: PHACOEMULSIFICATION CLEAR CORNEA WITH STANDARD INTRAOCULAR LENS IMPLANT;  Surgeon: Austin Garcia MD;  Location: [facility]     PHACOEMULSIFICATION CLEAR CORNEA  WITH STANDARD INTRAOCULAR LENS IMPLANT Left 9/25/2014    Procedure: PHACOEMULSIFICATION CLEAR CORNEA WITH STANDARD INTRAOCULAR LENS IMPLANT;  Surgeon: Austin Garcia MD;  Location: [facility]     TONSILLECTOMY  1945     TUBAL LIGATION  1970's       Social History   Substance Use Topics     Smoking status: Former Smoker     Quit date: 11/15/1990     Smokeless tobacco: Never Used     Alcohol use 0.0 oz/week     0 Standard drinks or equivalent per week      Comment: occassional     Family History   Problem Relation Age of Onset     Asthma Mother      OSTEOPOROSIS Mother      C.A.D. Father      DIABETES Daughter      Thyroid Disease Daughter      C.A.D. Brother      C.A.D. Brother      Thyroid Disease Sister      DIABETES Brother      Hypertension No family hx of      Breast Cancer No family hx of      Cancer - colorectal No family hx of      Anesthesia Reaction No family hx of      Blood Disease No family hx of      Eye Disorder No family hx of          Current Outpatient Prescriptions   Medication Sig Dispense Refill     albuterol (VENTOLIN HFA) 108 (90 BASE) MCG/ACT Inhaler Inhale 2 puffs into the lungs 4 times daily as needed 3 Inhaler 1     amLODIPine (NORVASC) 2.5 MG tablet Take 1 tablet (2.5 mg) by mouth daily 90 tablet 3     benzonatate (TESSALON) 100 MG capsule Take 1 capsule (100 mg) by mouth 3 times daily as needed 30 capsule 0     Calcium Carbonate-Vitamin D (CALCIUM + D PO) Take 2 tablets by mouth daily.  0     fluticasone (FLOVENT HFA) 110 MCG/ACT Inhaler Inhale 2 puffs into the lungs 2 times daily 3 Inhaler 3     Multiple Vitamins-Minerals (MULTIVITAMIN PO)        Omega-3 Fatty Acids (OMEGA-3 FISH OIL PO)        tiotropium (SPIRIVA HANDIHALER) 18 MCG capsule INHALE THE CONTENTS OF 1 CAPSULE DAILY. 90 capsule 3     Allergies   Allergen Reactions     Benadryl [Diphenhydramine Hcl]      Morphine      Penicillins        Reviewed and updated as needed this visit by clinical staff       Reviewed and updated  "as needed this visit by Provider         ROS:  Constitutional, HEENT, cardiovascular, pulmonary, gi and gu systems are negative, except as otherwise noted.    OBJECTIVE:     /81  Pulse 76  Temp 97.7  F (36.5  C) (Tympanic)  Resp 14  Ht 5' 1\" (1.549 m)  Wt 133 lb 6.4 oz (60.5 kg)  SpO2 96%  BMI 25.21 kg/m2  Body mass index is 25.21 kg/(m^2).  GENERAL: healthy, alert and no distress  HENT: ear canals and TM's normal, nose and mouth without ulcers or lesions  NECK: no adenopathy  RESP: lungs clear to auscultation - no rales, rhonchi or wheezes  CV: regular rate and rhythm, normal S1 S2, no S3 or S4, no murmur, click or rub    Diagnostic Test Results:  none     ASSESSMENT/PLAN:       1. Upper respiratory tract infection, unspecified type  Lungs clear, patient is very well appearing today, symptoms are consistent with URI.  Will treat supportively with tessalon and albuterol PRN.  She will follow up if symptoms worsen.  - benzonatate (TESSALON) 100 MG capsule; Take 1 capsule (100 mg) by mouth 3 times daily as needed  Dispense: 30 capsule; Refill: 0    See Patient Instructions    Abiodun Myers PA-C  [facility] [place]  "

## 2018-05-15 ASSESSMENT — ASTHMA QUESTIONNAIRES: ACT_TOTALSCORE: 24

## 2018-10-22 ENCOUNTER — OFFICE VISIT (OUTPATIENT)
Dept: FAMILY MEDICINE | Facility: CLINIC | Age: 83
End: 2018-10-22
Payer: COMMERCIAL

## 2018-10-22 VITALS
OXYGEN SATURATION: 96 % | HEART RATE: 69 BPM | WEIGHT: 134 LBS | BODY MASS INDEX: 25.3 KG/M2 | TEMPERATURE: 97.1 F | SYSTOLIC BLOOD PRESSURE: 135 MMHG | HEIGHT: 61 IN | DIASTOLIC BLOOD PRESSURE: 68 MMHG

## 2018-10-22 DIAGNOSIS — J45.30 MILD PERSISTENT ASTHMA WITHOUT COMPLICATION: ICD-10-CM

## 2018-10-22 DIAGNOSIS — Z23 NEED FOR VACCINATION: ICD-10-CM

## 2018-10-22 DIAGNOSIS — M85.9 LOW BONE DENSITY: ICD-10-CM

## 2018-10-22 DIAGNOSIS — Z90.710 S/P HYSTERECTOMY: ICD-10-CM

## 2018-10-22 DIAGNOSIS — Z23 NEED FOR PROPHYLACTIC VACCINATION AND INOCULATION AGAINST INFLUENZA: ICD-10-CM

## 2018-10-22 DIAGNOSIS — Z78.0 MENOPAUSE: ICD-10-CM

## 2018-10-22 DIAGNOSIS — I73.00 RAYNAUD'S DISEASE WITHOUT GANGRENE: ICD-10-CM

## 2018-10-22 DIAGNOSIS — Z00.00 ROUTINE GENERAL MEDICAL EXAMINATION AT A HEALTH CARE FACILITY: Primary | ICD-10-CM

## 2018-10-22 PROCEDURE — G0008 ADMIN INFLUENZA VIRUS VAC: HCPCS | Mod: 59 | Performed by: FAMILY MEDICINE

## 2018-10-22 PROCEDURE — 90715 TDAP VACCINE 7 YRS/> IM: CPT | Performed by: FAMILY MEDICINE

## 2018-10-22 PROCEDURE — 80061 LIPID PANEL: CPT | Performed by: FAMILY MEDICINE

## 2018-10-22 PROCEDURE — 99397 PER PM REEVAL EST PAT 65+ YR: CPT | Mod: 25 | Performed by: FAMILY MEDICINE

## 2018-10-22 PROCEDURE — 36415 COLL VENOUS BLD VENIPUNCTURE: CPT | Performed by: FAMILY MEDICINE

## 2018-10-22 PROCEDURE — 90471 IMMUNIZATION ADMIN: CPT | Performed by: FAMILY MEDICINE

## 2018-10-22 PROCEDURE — 90662 IIV NO PRSV INCREASED AG IM: CPT | Performed by: FAMILY MEDICINE

## 2018-10-22 RX ORDER — ALBUTEROL SULFATE 90 UG/1
2 AEROSOL, METERED RESPIRATORY (INHALATION) 4 TIMES DAILY PRN
Qty: 3 INHALER | Refills: 1 | Status: SHIPPED | OUTPATIENT
Start: 2018-10-22 | End: 2019-11-05

## 2018-10-22 RX ORDER — TIOTROPIUM BROMIDE 18 UG/1
CAPSULE ORAL; RESPIRATORY (INHALATION)
Qty: 90 CAPSULE | Refills: 1 | Status: SHIPPED | OUTPATIENT
Start: 2018-10-22 | End: 2019-04-25

## 2018-10-22 RX ORDER — AMLODIPINE BESYLATE 2.5 MG/1
2.5 TABLET ORAL DAILY
Qty: 90 TABLET | Refills: 1 | Status: SHIPPED | OUTPATIENT
Start: 2018-10-22 | End: 2019-04-25

## 2018-10-22 RX ORDER — FLUTICASONE PROPIONATE 110 UG/1
2 AEROSOL, METERED RESPIRATORY (INHALATION) 2 TIMES DAILY
Qty: 3 INHALER | Refills: 1 | Status: SHIPPED | OUTPATIENT
Start: 2018-10-22 | End: 2019-04-25

## 2018-10-22 NOTE — PROGRESS NOTES
"  SUBJECTIVE:   Kylah Carrasquillo is a 82 year old female who presents for Preventive Visit.    Are you in the first 12 months of your Medicare Part B coverage?  No    Physical Health:    In general, how would you rate your overall physical health? fair    Outside of work, how many days during the week do you exercise? none    Outside of work, approximately how many minutes a day do you exercise?not applicable    If you drink alcohol do you typically have >3 drinks per day or >7 drinks per week? No    Do you usually eat at least 4 servings of fruit and vegetables a day, include whole grains & fiber and avoid regularly eating high fat or \"junk\" foods? Yes    Do you have any problems taking medications regularly?  No    Do you have any side effects from medications? none    Needs assistance for the following daily activities: no    Home safety:  none identified     Hearing impairment: Yes, wears hearing aide     In the past 6 months, have you been bothered by leaking of urine? no    Mental Health:    In general, how would you rate your overall mental or emotional health? good  PHQ-2 Score:       Additional concerns to address?  No    Fall risk:      Fallen 2 or more times in the past year?: No  Any fall with injury in the past year?: No    click delete button to remove this line now    COGNITIVE SCREEN  1) Repeat 3 items (Leader, Season, Table)    2) Clock draw: NORMAL  3) 3 item recall: Recalls 3 objects  Results: 3 items recalled: COGNITIVE IMPAIRMENT LESS LIKELY    Mini-CogTM Copyright S Kane. Licensed by the author for use in [facility]; reprinted with permission (moe@.[email or web address]). All rights reserved.            PROBLEMS TO ADD ON...  Need refill on all her med's.  Doing well otherwise.   Asthma Follow-Up    Was ACT completed today?    Yes  , well controlled on current meds  ACT Total Scores 5/14/2018   ACT TOTAL SCORE -   ASTHMA ER VISITS -   ASTHMA HOSPITALIZATIONS -   ACT TOTAL SCORE (Goal Greater " than or Equal to 20) 24   In the past 12 months, how many times did you visit the emergency room for your asthma without being admitted to the hospital? 0   In the past 12 months, how many times were you hospitalized overnight because of your asthma? 0       Recent asthma triggers that patient is dealing with: None        Reviewed and updated as needed this visit by clinical staff         Reviewed and updated as needed this visit by Provider        Social History   Substance Use Topics     Smoking status: Former Smoker     Quit date: 11/15/1990     Smokeless tobacco: Never Used     Alcohol use 0.0 oz/week     0 Standard drinks or equivalent per week      Comment: occassional                             Do you feel safe in your environment - Yes    Do you have a Health Care Directive?: Yes: Advance Directive has been received and scanned.    Current providers sharing in care for this patient include:   Patient Care Team:  Abiodun Myers PA-C as PCP - General (Physician Assistant)    The following health maintenance items are reviewed in Epic and correct as of today:  Health Maintenance   Topic Date Due     ASTHMA ACTION PLAN Q1 YR  05/08/2015     TETANUS IMMUNIZATION (SYSTEM ASSIGNED)  10/19/2017     INFLUENZA VACCINE (1) 09/01/2018     ASTHMA CONTROL TEST Q6 MOS  11/14/2018     FALL RISK ASSESSMENT  05/14/2019     PHQ-2 Q1 YR  05/14/2019     ADVANCE DIRECTIVE PLANNING Q5 YRS  05/14/2023     DEXA SCAN SCREENING (SYSTEM ASSIGNED)  Completed     Patient Active Problem List   Diagnosis     Mild intermittent asthma     Cardiovascular disease     Raynaud's syndrome     Advanced directives, counseling/discussion     Osteoporosis     Hyperlipidemia LDL goal <100     First degree AV block     Cataracta     Hallux valgus, acquired     Arthritis of right foot     S/P hysterectomy     Overweight (BMI 25.0-29.9)     Past Surgical History:   Procedure Laterality Date     CYSTOCELE REPAIR  1970's     HEMORRHOIDECTOMY   1970's     HERNIA REPAIR, UMBILICAL  1970's     HYSTERECTOMY, VAGINAL  1988    menorrhagia     PHACOEMULSIFICATION CLEAR CORNEA WITH STANDARD INTRAOCULAR LENS IMPLANT  8/7/2014    Procedure: PHACOEMULSIFICATION CLEAR CORNEA WITH STANDARD INTRAOCULAR LENS IMPLANT;  Surgeon: Austin Garcia MD;  [facility]     PHACOEMULSIFICATION CLEAR CORNEA WITH STANDARD INTRAOCULAR LENS IMPLANT Left 9/25/2014    Procedure: PHACOEMULSIFICATION CLEAR CORNEA WITH STANDARD INTRAOCULAR LENS IMPLANT;  Surgeon: Austin Garcia MD;  Location:  [facility]     TONSILLECTOMY  1945     TUBAL LIGATION  1970's       Social History   Substance Use Topics     Smoking status: Former Smoker     Quit date: 11/15/1990     Smokeless tobacco: Never Used     Alcohol use 0.0 oz/week     0 Standard drinks or equivalent per week      Comment: occassional     Family History   Problem Relation Age of Onset     Asthma Mother      Osteoporosis Mother      C.A.D. Father      Diabetes Daughter      Thyroid Disease Daughter      C.A.D. Brother      C.A.D. Brother      Thyroid Disease Sister      Diabetes Brother      Hypertension No family hx of      Breast Cancer No family hx of      Cancer - colorectal No family hx of      Anesthesia Reaction No family hx of      Blood Disease No family hx of      Eye Disorder No family hx of            Pneumonia Vaccine:Adults age 65+ who received Pneumovax (PPSV23) at 65 years or older: Should be given PCV13 > 1 year after their most recent PPSV23  Mammogram Screening: Patient over age 75, has elected to stop mammography screening.    ROS:  CONSTITUTIONAL: NEGATIVE for fever, chills, change in weight  INTEGUMENTARY/SKIN: NEGATIVE for worrisome rashes, moles or lesions  EYES: NEGATIVE for vision changes or irritation  ENT/MOUTH: NEGATIVE for ear, mouth and throat problems  RESP: NEGATIVE for significant cough or SOB  BREAST: NEGATIVE for masses, tenderness or discharge  CV: NEGATIVE for chest pain, palpitations or  "peripheral edema  GI: NEGATIVE for nausea, abdominal pain, heartburn, or change in bowel habits  : NEGATIVE for frequency, dysuria, or hematuria  MUSCULOSKELETAL: NEGATIVE for significant arthralgias or myalgia  NEURO: NEGATIVE for weakness, dizziness or paresthesias  ENDOCRINE: NEGATIVE for temperature intolerance, skin/hair changes  HEME: NEGATIVE for bleeding problems  PSYCHIATRIC: NEGATIVE for changes in mood or affect    OBJECTIVE:   There were no vitals taken for this visit. Estimated body mass index is 25.21 kg/(m^2) as calculated from the following:    Height as of 5/14/18: 5' 1\" (1.549 m).    Weight as of 5/14/18: 133 lb 6.4 oz (60.5 kg).  EXAM:   GENERAL: healthy, alert and no distress  EYES: Eyes grossly normal to inspection, PERRL and conjunctivae and sclerae normal  HENT: ear canals and TM's normal, nose and mouth without ulcers or lesions  NECK: no adenopathy, no asymmetry, masses, or scars and thyroid normal to palpation  RESP: lungs clear to auscultation - no rales, rhonchi or wheezes  BREAST: normal without masses, tenderness or nipple discharge and no palpable axillary masses or adenopathy  CV: regular rate and rhythm, normal S1 S2, no S3 or S4, no murmur, click or rub, no peripheral edema and peripheral pulses strong  ABDOMEN: soft, nontender, no hepatosplenomegaly, no masses and bowel sounds normal   (female): deferred  RECTAL: deferred  MS: no gross musculoskeletal defects noted, no edema  SKIN: no suspicious lesions or rashes  NEURO: Normal strength and tone, mentation intact and speech normal  PSYCH: mentation appears normal, affect normal/bright            ASSESSMENT / PLAN:   (Z00.00) Routine general medical examination at a health care facility  (primary encounter diagnosis)  Comment:   Plan: Lipid panel reflex to direct LDL Fasting,         tiotropium (SPIRIVA HANDIHALER) 18 MCG capsule,        fluticasone (FLOVENT HFA) 110 MCG/ACT Inhaler,         amLODIPine (NORVASC) 2.5 MG tablet, " "TDAP         VACCINE (ADACEL) [67148.002], 1st          Administration  [60305], FLU VACCINE, INCREASED        ANTIGEN, PRESV FREE, AGE 65+ [49730], Vaccine         Administration, Each Additional [90772]            (J45.20) Mild intermittent asthma without complication  Comment:   Plan: tiotropium (SPIRIVA HANDIHALER) 18 MCG capsule,        fluticasone (FLOVENT HFA) 110 MCG/ACT Inhaler            (I73.00) Raynaud's disease without gangrene  Comment:   Plan: amLODIPine (NORVASC) 2.5 MG tablet            (Z23) Need for vaccination  Comment:   Plan:     (Z23) Need for prophylactic vaccination and inoculation against influenza  Comment:   Plan: FLU VACCINE, INCREASED ANTIGEN, PRESV FREE, AGE        65+ [39127], Vaccine Administration, Each         Additional [62709]          (Z90.710) S/P hysterectomy  Comment:   Plan:     (M85.80) Low bone density  Comment:   Plan:     (Z78.0) Menopause  Comment:   Plan: DX Hip/Pelvis/Spine          Check labs. refill sent.Cares and  treatment discussed.  follow up if problem   Patient expressed understanding and agreement with treatment plan. All patient's questions were answered, will let me know if has more later.  Medications: Rx's: Reviewed the potential side effects/complications of medications prescribed.       End of Life Planning:  Patient currently has an advanced directive: Yes.  Practitioner is supportive of decision.    COUNSELING:  Reviewed preventive health counseling, as reflected in patient instructions       Regular exercise       Healthy diet/nutrition       Vision screening       Hearing screening       Dental care       Immunizations    Vaccinated for: Influenza and TDAP             Osteoporosis Prevention/Bone Health    BP Readings from Last 1 Encounters:   05/14/18 122/81     Estimated body mass index is 25.21 kg/(m^2) as calculated from the following:    Height as of 5/14/18: 5' 1\" (1.549 m).    Weight as of 5/14/18: 133 lb 6.4 oz (60.5 kg).           reports " that she quit smoking about 27 years ago. She has never used smokeless tobacco.      Appropriate preventive services were discussed with this patient, including applicable screening as appropriate for cardiovascular disease, diabetes, osteopenia/osteoporosis, and glaucoma.  As appropriate for age/gender, discussed screening for colorectal cancer, prostate cancer, breast cancer, and cervical cancer. Checklist reviewing preventive services available has been given to the patient.    Reviewed patients plan of care and provided an AVS. The Basic Care Plan (routine screening as documented in Health Maintenance) for Kylah meets the Care Plan requirement. This Care Plan has been established and reviewed with the Patient.    Counseling Resources:  ATP IV Guidelines  Pooled Cohorts Equation Calculator  Breast Cancer Risk Calculator  FRAX Risk Assessment  ICSI Preventive Guidelines  Dietary Guidelines for Americans, 2010  [employer] MyPlate  ASA Prophylaxis  Lung CA Screening    Azucena Almanzar MD  [facility] [place]    Injectable Influenza Immunization Documentation    1.  Is the person to be vaccinated sick today?   No    2. Does the person to be vaccinated have an allergy to a component   of the vaccine?   No  Egg Allergy Algorithm Link    3. Has the person to be vaccinated ever had a serious reaction   to influenza vaccine in the past?   No    4. Has the person to be vaccinated ever had Guillain-Barré syndrome?   No    Form completed by af

## 2018-10-22 NOTE — MR AVS SNAPSHOT
After Visit Summary   10/22/2018    Kylah Carrasqiullo    MRN: [id number]           Patient Information     Date Of Birth          1935        Visit Information        Provider Department      10/22/2018 10:00 AM Azucena Almanzar MD [facility] [place]        Today's Diagnoses     Routine general medical examination at a health care facility    -  1    Raynaud's disease without gangrene        Need for vaccination        Need for prophylactic vaccination and inoculation against influenza        S/P hysterectomy        Low bone density        Menopause        Mild persistent asthma without complication          Care Instructions      Preventive Health Recommendations  Female Ages 65 +    Yearly exam:     See your health care provider every year in order to  o Review health changes.   o Discuss preventive care.    o Review your medicines if your doctor has prescribed any.      You no longer need a yearly Pap test unless you've had an abnormal Pap test in the past 10 years. If you have vaginal symptoms, such as bleeding or discharge, be sure to talk with your provider about a Pap test.      Every 1 to 2 years, have a mammogram.  If you are over 69, talk with your health care provider about whether or not you want to continue having screening mammograms.      Every 10 years, have a colonoscopy. Or, have a yearly FIT test (stool test). These exams will check for colon cancer.       Have a cholesterol test every 5 years, or more often if your doctor advises it.       Have a diabetes test (fasting glucose) every three years. If you are at risk for diabetes, you should have this test more often.       At age 65, have a bone density scan (DEXA) to check for osteoporosis (brittle bone disease).    Shots:    Get a flu shot each year.    Get a tetanus shot every 10 years.    Talk to your doctor about your pneumonia vaccines. There are now two you should receive - Pneumovax (PPSV 23) and  Prevnar (PCV 13).    Talk to your pharmacist about the shingles vaccine.    Talk to your doctor about the hepatitis B vaccine.    Nutrition:     Eat at least 5 servings of fruits and vegetables each day.      Eat whole-grain bread, whole-wheat pasta and brown rice instead of white grains and rice.      Get adequate about Calcium and Vitamin D.     Lifestyle    Exercise at least 150 minutes a week (30 minutes a day, 5 days a week). This will help you control your weight and prevent disease.      Limit alcohol to one drink per day.      No smoking.       Wear sunscreen to prevent skin cancer.       See your dentist twice a year for an exam and cleaning.      See your eye doctor every 1 to 2 years to screen for conditions such as glaucoma, macular degeneration, cataracts, etc           Follow-ups after your visit        Follow-up notes from your care team     Return in about 3 weeks (around 11/12/2018), or SOONER IF PROBLEM .      Future tests that were ordered for you today     Open Future Orders        Priority Expected Expires Ordered    DX Hip/Pelvis/Spine Routine  10/22/2019 10/22/2018            Who to contact     If you have questions or need follow up information about today's clinic visit or your schedule please contact [facility] [place] directly at [phone].  Normal or non-critical lab and imaging results will be communicated to you by MyChart, letter or phone within 4 business days after the clinic has received the results. If you do not hear from us within 7 days, please contact the clinic through MyChart or phone. If you have a critical or abnormal lab result, we will notify you by phone as soon as possible.  Submit refill requests through [employer] or call your pharmacy and they will forward the refill request to us. Please allow 3 business days for your refill to be completed.          Additional Information About Your Visit        Care EveryWhere ID     This is your Care EveryWhere ID.  "This could be used by other organizations to access your [facility] medical records  [id number]        Your Vitals Were     Pulse Temperature Height Pulse Oximetry BMI (Body Mass Index)       69 97.1  F (36.2  C) (Tympanic) 5' 1\" (1.549 m) 96% 25.32 kg/m2        Blood Pressure from Last 3 Encounters:   10/22/18 135/68   05/14/18 122/81   10/05/17 128/68    Weight from Last 3 Encounters:   10/22/18 134 lb (60.8 kg)   05/14/18 133 lb 6.4 oz (60.5 kg)   10/05/17 132 lb (59.9 kg)              We Performed the Following     1st  Administration  [80562]     FLU VACCINE, INCREASED ANTIGEN, PRESV FREE, AGE 65+ [85686]     Lipid panel reflex to direct LDL Fasting     TDAP VACCINE (ADACEL) [22452.002]     Vaccine Administration, Each Additional [36543]          Where to get your medicines      These medications were sent to [id number] - [place] - [address]  [address] [facility] [place] [address]     Phone:  [phone]     albuterol 108 (90 Base) MCG/ACT inhaler    amLODIPine 2.5 MG tablet    fluticasone 110 MCG/ACT Inhaler    tiotropium 18 MCG capsule          Primary Care Provider Office Phone # Fax #    Abiodun Myers PA-C [phone]       [address]  [place] [address]        Equal Access to Services     MARIA FERNANDA CORNELIUS AH: Hadii aad ku hadasho Soomaali, waaxda luqadaha, qaybta kaalmada adeegyada, erasmo maldonado. So [facility] [phone].    ATENCIÓN: Si habla español, tiene a still disposición servicios gratuitos de asistencia lingüística. Llame al [phone].    We comply with applicable federal civil rights laws and [place] laws. We do not discriminate on the basis of race, color, national origin, age, disability, sex, sexual orientation, or gender identity.            Thank you!     Thank you for choosing [facility] [place]  for your care. Our goal is always to provide you with excellent care. Hearing back from " our patients is one way we can continue to improve our services. Please take a few minutes to complete the written survey that you may receive in the mail after your visit with us. Thank you!             Your Updated Medication List - Protect others around you: Learn how to safely use, store and throw away your medicines at www.disposemymeds.org.          This list is accurate as of 10/22/18 10:46 AM.  Always use your most recent med list.                   Brand Name Dispense Instructions for use Diagnosis    albuterol 108 (90 Base) MCG/ACT inhaler    VENTOLIN HFA    3 Inhaler    Inhale 2 puffs into the lungs 4 times daily as needed    Mild persistent asthma without complication       amLODIPine 2.5 MG tablet    NORVASC    90 tablet    Take 1 tablet (2.5 mg) by mouth daily    Raynaud's disease without gangrene, Routine general medical examination at a health care facility       CALCIUM + D PO      Take 2 tablets by mouth daily.    Routine general medical examination at a health care facility       fluticasone 110 MCG/ACT Inhaler    FLOVENT HFA    3 Inhaler    Inhale 2 puffs into the lungs 2 times daily    Routine general medical examination at a health care facility, Mild persistent asthma without complication       MULTIVITAMIN PO           OMEGA-3 FISH OIL PO           tiotropium 18 MCG capsule    SPIRIVA HANDIHALER    90 capsule    INHALE THE CONTENTS OF 1 CAPSULE DAILY.    Routine general medical examination at a health care facility, Mild persistent asthma without complication

## 2018-10-23 LAB
CHOLEST SERPL-MCNC: 233 MG/DL
HDLC SERPL-MCNC: 59 MG/DL
LDLC SERPL CALC-MCNC: 152 MG/DL
NONHDLC SERPL-MCNC: 174 MG/DL
TRIGL SERPL-MCNC: 111 MG/DL

## 2018-11-14 ENCOUNTER — OFFICE VISIT (OUTPATIENT)
Dept: FAMILY MEDICINE | Facility: CLINIC | Age: 83
End: 2018-11-14
Payer: COMMERCIAL

## 2018-11-14 VITALS
WEIGHT: 136 LBS | HEIGHT: 61 IN | BODY MASS INDEX: 25.68 KG/M2 | OXYGEN SATURATION: 96 % | SYSTOLIC BLOOD PRESSURE: 127 MMHG | TEMPERATURE: 97.8 F | DIASTOLIC BLOOD PRESSURE: 68 MMHG | HEART RATE: 67 BPM

## 2018-11-14 DIAGNOSIS — J45.30 MILD PERSISTENT ASTHMA WITHOUT COMPLICATION: ICD-10-CM

## 2018-11-14 PROCEDURE — 99213 OFFICE O/P EST LOW 20 MIN: CPT | Performed by: FAMILY MEDICINE

## 2018-11-14 NOTE — PROGRESS NOTES
"  SUBJECTIVE:   Kylah Carrasquillo is a 82 year old female who presents to clinic today for the following health issues:      Asthma Follow-Up    Was ACT completed today?    Yes  .    here to follow-up on her asthma.  She has been on Spiriva and Flovent for many years.  Use albuterol only as needed which is rare.    She has noticed a few times where she had felt \"a little bit short of breath with certain strenuous physical activity \"although she never tried her  Inhaler previously.  she was reminded to use the albuterol, although  now that she has tried it,  she clearly think that it does help.  Denies any associated chest pain palpitation dizziness with that otherwise.  She is pretty sure that it is her lungs. also she just wanted to  follow-up and  Check,  to make sure she is okay.   She think her need for albuterol is very rare once a month or even less  at times.  She always carries albuterol with her     ACT Total Scores 11/14/2018   ACT TOTAL SCORE -   ASTHMA ER VISITS -   ASTHMA HOSPITALIZATIONS -   ACT TOTAL SCORE (Goal Greater than or Equal to 20) 24   In the past 12 months, how many times did you visit the emergency room for your asthma without being admitted to the hospital? 0   In the past 12 months, how many times were you hospitalized overnight because of your asthma? 0       Recent asthma triggers that patient is dealing with: None        Amount of exercise or physical activity: None    Problems taking medications regularly: No    Medication side effects: none    Diet: regular (no restrictions)        PROBLEMS TO ADD ON...    Problem list and histories reviewed & adjusted, as indicated.  Additional history: as documented    Patient Active Problem List   Diagnosis     Mild intermittent asthma     Cardiovascular disease     Raynaud's syndrome     Advanced directives, counseling/discussion     Osteoporosis     Hyperlipidemia LDL goal <100     First degree AV block     Cataracta     Hallux valgus, " "acquired     Arthritis of right foot     S/P hysterectomy     Overweight (BMI 25.0-29.9)     Past Surgical History:   Procedure Laterality Date     CYSTOCELE REPAIR  1970's     HEMORRHOIDECTOMY  1970's     HERNIA REPAIR, UMBILICAL  1970's     HYSTERECTOMY, VAGINAL  1988    menorrhagia     PHACOEMULSIFICATION CLEAR CORNEA WITH STANDARD INTRAOCULAR LENS IMPLANT  8/7/2014    Procedure: PHACOEMULSIFICATION CLEAR CORNEA WITH STANDARD INTRAOCULAR LENS IMPLANT;  Surgeon: Austin Garcia MD;  [facility]:  [facility]     PHACOEMULSIFICATION CLEAR CORNEA WITH STANDARD INTRAOCULAR LENS IMPLANT Left 9/25/2014    Procedure: PHACOEMULSIFICATION CLEAR CORNEA WITH STANDARD INTRAOCULAR LENS IMPLANT;  Surgeon: Austin Garcia MD;  Location:  [facility]     TONSILLECTOMY  1945     TUBAL LIGATION  1970's       Social History   Substance Use Topics     Smoking status: Former Smoker     Quit date: 11/15/1990     Smokeless tobacco: Never Used     Alcohol use 0.0 oz/week     0 Standard drinks or equivalent per week      Comment: occassional     Family History   Problem Relation Age of Onset     Asthma Mother      Osteoporosis Mother      C.A.D. Father      Diabetes Daughter      Thyroid Disease Daughter      C.A.D. Brother      C.A.D. Brother      Thyroid Disease Sister      Diabetes Brother      Hypertension No family hx of      Breast Cancer No family hx of      Cancer - colorectal No family hx of      Anesthesia Reaction No family hx of      Blood Disease No family hx of      Eye Disorder No family hx of            Reviewed and updated as needed this visit by clinical staff  Tobacco  Allergies  Meds       Reviewed and updated as needed this visit by Provider         ROS:  Constitutional, HEENT, cardiovascular, pulmonary, GI, , musculoskeletal, neuro, skin, endocrine and psych systems are negative, except as otherwise noted.    OBJECTIVE:     /68  Pulse 67  Temp 97.8  F (36.6  C) (Tympanic)  Ht 5' 1\" (1.549 m)  Wt 136 lb " (61.7 kg)  SpO2 96%  BMI 25.7 kg/m2  Body mass index is 25.7 kg/(m^2).  GENERAL: healthy, alert and no distress  RESP: lungs clear to auscultation - no rales, rhonchi or wheezes  CV: regular rate and rhythm, normal S1 S2, no S3 or S4, no murmur,   MS: no edema        ASSESSMENT/PLAN:       (J45.30) Mild persistent asthma without complication  Comment:   Plan: doing well on current med's   Discussed allergy/ asthma and treatment. Also continue with controller med's and albuterol only as needed . Need to monitor the need for albuterol .   Cares and  treatment discussed follow up  In 6 months,  sooner, if problem        Patient expressed understanding and agreement with treatment plan. All patient's questions were answered, will let me know if has more later.  Medications: Rx's: Reviewed the potential side effects/complications of medications prescribed.       Azucena Almanzar MD  [facility] [place]

## 2018-11-14 NOTE — MR AVS SNAPSHOT
"              After Visit Summary   11/14/2018    Kylah Carrasquillo    MRN: [id number]           Patient Information     Date Of Birth          1935        Visit Information        Provider Department      11/14/2018 10:00 AM Azucena Almanzar MD [facility] [place]        Today's Diagnoses     Mild persistent asthma without complication          Care Instructions    Take medications as directed.  Treatment  and symptomatic cares discussed   Follow up if problem or concern             Follow-ups after your visit        Follow-up notes from your care team     Return in about 6 months (around 5/14/2019), or sooner if problem .      Who to contact     If you have questions or need follow up information about today's clinic visit or your schedule please contact [facility] [place] directly at [phone].  Normal or non-critical lab and imaging results will be communicated to you by MyChart, letter or phone within 4 business days after the clinic has received the results. If you do not hear from us within 7 days, please contact the clinic through MyChart or phone. If you have a critical or abnormal lab result, we will notify you by phone as soon as possible.  Submit refill requests through [employer] or call your pharmacy and they will forward the refill request to us. Please allow 3 business days for your refill to be completed.          Additional Information About Your Visit        Care EveryWhere ID     This is your Care EveryWhere ID. This could be used by other organizations to access your [place] medical records  [id number]        Your Vitals Were     Pulse Temperature Height Pulse Oximetry BMI (Body Mass Index)       67 97.8  F (36.6  C) (Tympanic) 5' 1\" (1.549 m) 96% 25.7 kg/m2        Blood Pressure from Last 3 Encounters:   11/14/18 127/68   10/22/18 135/68   05/14/18 122/81    Weight from Last 3 Encounters:   11/14/18 136 lb (61.7 kg)   10/22/18 134 lb (60.8 kg)   05/14/18 133 " lb 6.4 oz (60.5 kg)              Today, you had the following     No orders found for display       Primary Care Provider Office Phone # Fax #    Azucena Almanzar [phone] [address]  [place] [address]        Equal Access to Services     [facility]: Hadii aad ku hadasho Soomaali, waaxda luqadaha, qaybta kaalmada adeegyada, waxay idiin hayaan adeeg kharash la'aan . So [facility] [phone].    ATENCIÓN: Si habla español, tiene a still disposición servicios gratuitos de asistencia lingüística. Llame al [phone].    We comply with applicable federal civil rights laws and [place] laws. We do not discriminate on the basis of race, color, national origin, age, disability, sex, sexual orientation, or gender identity.            Thank you!     Thank you for choosing [facility] [place]  for your care. Our goal is always to provide you with excellent care. Hearing back from our patients is one way we can continue to improve our services. Please take a few minutes to complete the written survey that you may receive in the mail after your visit with us. Thank you!             Your Updated Medication List - Protect others around you: Learn how to safely use, store and throw away your medicines at www.disposemymeds.org.          This list is accurate as of 11/14/18 10:30 AM.  Always use your most recent med list.                   Brand Name Dispense Instructions for use Diagnosis    albuterol 108 (90 Base) MCG/ACT inhaler    VENTOLIN HFA    3 Inhaler    Inhale 2 puffs into the lungs 4 times daily as needed    Mild persistent asthma without complication       amLODIPine 2.5 MG tablet    NORVASC    90 tablet    Take 1 tablet (2.5 mg) by mouth daily    Raynaud's disease without gangrene, Routine general medical examination at a health care facility       CALCIUM + D PO      Take 2 tablets by mouth daily.    Routine general medical examination at a health care facility        fluticasone 110 MCG/ACT Inhaler    FLOVENT HFA    3 Inhaler    Inhale 2 puffs into the lungs 2 times daily    Routine general medical examination at a health care facility, Mild persistent asthma without complication       MULTIVITAMIN PO           OMEGA-3 FISH OIL PO           tiotropium 18 MCG capsule    SPIRIVA HANDIHALER    90 capsule    INHALE THE CONTENTS OF 1 CAPSULE DAILY.    Routine general medical examination at a health care facility, Mild persistent asthma without complication

## 2018-11-15 ASSESSMENT — ASTHMA QUESTIONNAIRES: ACT_TOTALSCORE: 24

## 2019-05-21 ENCOUNTER — OFFICE VISIT (OUTPATIENT)
Dept: FAMILY MEDICINE | Facility: CLINIC | Age: 84
End: 2019-05-21
Payer: COMMERCIAL

## 2019-05-21 VITALS
BODY MASS INDEX: 25.28 KG/M2 | DIASTOLIC BLOOD PRESSURE: 70 MMHG | OXYGEN SATURATION: 97 % | TEMPERATURE: 97.3 F | HEART RATE: 75 BPM | WEIGHT: 133.8 LBS | SYSTOLIC BLOOD PRESSURE: 124 MMHG

## 2019-05-21 DIAGNOSIS — I73.00 RAYNAUD'S DISEASE WITHOUT GANGRENE: ICD-10-CM

## 2019-05-21 DIAGNOSIS — J45.30 MILD PERSISTENT ASTHMA WITHOUT COMPLICATION: ICD-10-CM

## 2019-05-21 PROCEDURE — 80053 COMPREHEN METABOLIC PANEL: CPT | Performed by: FAMILY MEDICINE

## 2019-05-21 PROCEDURE — 36415 COLL VENOUS BLD VENIPUNCTURE: CPT | Performed by: FAMILY MEDICINE

## 2019-05-21 PROCEDURE — 99213 OFFICE O/P EST LOW 20 MIN: CPT | Performed by: FAMILY MEDICINE

## 2019-05-21 RX ORDER — TIOTROPIUM BROMIDE 18 UG/1
CAPSULE ORAL; RESPIRATORY (INHALATION)
Qty: 90 CAPSULE | Refills: 1 | Status: SHIPPED | OUTPATIENT
Start: 2019-05-21 | End: 2019-10-08

## 2019-05-21 RX ORDER — AMLODIPINE BESYLATE 2.5 MG/1
2.5 TABLET ORAL DAILY
Qty: 90 TABLET | Refills: 1 | Status: SHIPPED | OUTPATIENT
Start: 2019-05-21 | End: 2019-11-05

## 2019-05-21 RX ORDER — FLUTICASONE PROPIONATE 110 UG/1
2 AEROSOL, METERED RESPIRATORY (INHALATION) 2 TIMES DAILY
Qty: 3 INHALER | Refills: 1 | Status: SHIPPED | OUTPATIENT
Start: 2019-05-21 | End: 2019-11-05

## 2019-05-21 RX ORDER — ALBUTEROL SULFATE 90 UG/1
2 AEROSOL, METERED RESPIRATORY (INHALATION) 4 TIMES DAILY PRN
Qty: 18 G | Refills: 1 | Status: CANCELLED | OUTPATIENT
Start: 2019-05-21

## 2019-05-21 NOTE — PROGRESS NOTES
Subjective     Kylah Carrasquillo is a 83 year old female who presents to clinic today for the following health issues:    HPI   Asthma Follow-Up     Was ACT completed today?    Yes  . [place] so she thinks  she needed albuterol few times n but she is feeling better now. Need refill on inhalers  ACT Total Scores 11/14/2018   ACT TOTAL SCORE -   ASTHMA ER VISITS -   ASTHMA HOSPITALIZATIONS -   ACT TOTAL SCORE (Goal Greater than or Equal to 20) 24   In the past 12 months, how many times did you visit the emergency room for your asthma without being admitted to the hospital? 0   In the past 12 months, how many times were you hospitalized overnight because of your asthma? 0       How many days per week do you miss taking your asthma controller medication?  0    Please describe any recent triggers for your asthma: smoke, dust mites, pollens, mold, humidity and strong odors and fumes    Have you had any Emergency Room Visits, Urgent Care Visits, or Hospital Admissions since your last office visit?  No      Add on problem     Has hx of Raynaud's   . doing well on current medication. Patient  thinks her BP has salud high previously. Medication helps.  Feels well otherwise. need refill on medications.       Amount of exercise or physical activity: None as of right now but before she was exercising for 2 days a week     Problems taking medications regularly: No    Medication side effects: none    Diet: regular (no restrictions)      {  Patient Active Problem List   Diagnosis     Mild intermittent asthma     Cardiovascular disease     Raynaud's syndrome     Advanced directives, counseling/discussion     Osteoporosis     Hyperlipidemia LDL goal <100     First degree AV block     Cataracta     Hallux valgus, acquired     Arthritis of right foot     S/P hysterectomy     Overweight (BMI 25.0-29.9)     Past Surgical History:   Procedure Laterality Date     CYSTOCELE REPAIR  1970's     HEMORRHOIDECTOMY  1970's      HERNIA REPAIR, UMBILICAL       HYSTERECTOMY, VAGINAL      menorrhagia     PHACOEMULSIFICATION CLEAR CORNEA WITH STANDARD INTRAOCULAR LENS IMPLANT  2014    Procedure: PHACOEMULSIFICATION CLEAR CORNEA WITH STANDARD INTRAOCULAR LENS IMPLANT;  Surgeon: Austin Garcia MD;  Location:  [facility]     PHACOEMULSIFICATION CLEAR CORNEA WITH STANDARD INTRAOCULAR LENS IMPLANT Left 2014    Procedure: PHACOEMULSIFICATION CLEAR CORNEA WITH STANDARD INTRAOCULAR LENS IMPLANT;  Surgeon: Austin Garcia MD;  Location:  [facility]     TONSILLECTOMY  1945     TUBAL LIGATION         Social History     Tobacco Use     Smoking status: Former Smoker     Last attempt to quit: 11/15/1990     Years since quittin.5     Smokeless tobacco: Never Used   Substance Use Topics     Alcohol use: Yes     Alcohol/week: 0.0 oz     Comment: occassional     Family History   Problem Relation Age of Onset     Asthma Mother      Osteoporosis Mother      C.A.D. Father      Diabetes Daughter      Thyroid Disease Daughter      C.A.D. Brother      C.A.D. Brother      Thyroid Disease Sister      Diabetes Brother      Hypertension No family hx of      Breast Cancer No family hx of      Cancer - colorectal No family hx of      Anesthesia Reaction No family hx of      Blood Disease No family hx of      Eye Disorder No family hx of            Reviewed and updated as needed this visit by Provider         Review of Systems   ROS COMP: Constitutional, HEENT, cardiovascular, pulmonary, GI, , musculoskeletal, neuro, skin, endocrine and psych systems are negative, except as otherwise noted.      Objective    There were no vitals taken for this visit.  There is no height or weight on file to calculate BMI.  Physical Exam   GENERAL: healthy, alert and no distress  EYES: Eyes grossly normal to inspection, PERRL and conjunctivae and sclerae normal  HENT: oropharynx clear and oral mucous membranes moist  NECK: no adenopathy, no asymmetry,  masses, or scars and thyroid normal to palpation  RESP: lungs clear to auscultation - no rales, rhonchi or wheezes  CV: regular rate and rhythm, normal S1 S2, no S3 or S4, no murmur, click or rub, no peripheral edema and peripheral pulses strong  ABDOMEN: soft, nontender, no hepatosplenomegaly, no masses and bowel sounds normal  MS: no edema            Assessment & Plan     (J45.30) Mild persistent asthma without complication  Comment: doing well   Plan: fluticasone (FLOVENT HFA) 110 MCG/ACT inhaler,         tiotropium (SPIRIVA HANDIHALER) 18 MCG inhaled         capsule       Doing well on current med's.  Refill sent. She will monitor her sx and follow up if problem    (I73.00) Raynaud's disease without gangrene  Comment: stable   Plan: amLODIPine (NORVASC) 2.5 MG tablet, CMP                 refill sent.Cares and  treatment discussed.  follow up if problem   Patient expressed understanding and agreement with treatment plan. All patient's questions were answered, will let me know if has more later.  Medications: Rx's: Reviewed the potential side effects/complications of medications prescribed.     See Patient Instructions      Azucena Almanzar MD  [facility]

## 2019-05-21 NOTE — LETTER
May 23, 2019      Kylah Villegaskendall  [address] [address]        Dear ,    We are writing to inform you of your test results.    Normal  liver function tests, kidney functions, glucose and  electrolytes(various salts in your body) .    Resulted Orders   Comprehensive metabolic panel   Result Value Ref Range    Sodium 139 133 - 144 mmol/L    Potassium 4.1 3.4 - 5.3 mmol/L    Chloride 109 94 - 109 mmol/L    Carbon Dioxide 22 20 - 32 mmol/L    Anion Gap 8 3 - 14 mmol/L    Glucose 89 70 - 99 mg/dL      Comment:      Non Fasting    Urea Nitrogen 14 7 - 30 mg/dL    Creatinine 0.73 0.52 - 1.04 mg/dL    GFR Estimate 75 >60 mL/min/[1.73_m2]      Comment:      Non  GFR Calc  Starting 12/18/2018, serum creatinine based estimated GFR (eGFR) will be   calculated using the Chronic Kidney Disease Epidemiology Collaboration   (CKD-EPI) equation.      GFR Estimate If Black 87 >60 mL/min/[1.73_m2]      Comment:       GFR Calc  Starting 12/18/2018, serum creatinine based estimated GFR (eGFR) will be   calculated using the Chronic Kidney Disease Epidemiology Collaboration   (CKD-EPI) equation.      Calcium 9.9 8.5 - 10.1 mg/dL    Bilirubin Total 0.4 0.2 - 1.3 mg/dL    Albumin 3.7 3.4 - 5.0 g/dL    Protein Total 7.1 6.8 - 8.8 g/dL    Alkaline Phosphatase 46 40 - 150 U/L    ALT 16 0 - 50 U/L    AST 14 0 - 45 U/L       If you have any questions or concerns, please call the clinic at the number listed above.       Sincerely,        Azucena Almanzar MD

## 2019-05-22 LAB
ALBUMIN SERPL-MCNC: 3.7 G/DL (ref 3.4–5)
ALP SERPL-CCNC: 46 U/L (ref 40–150)
ALT SERPL W P-5'-P-CCNC: 16 U/L (ref 0–50)
ANION GAP SERPL CALCULATED.3IONS-SCNC: 8 MMOL/L (ref 3–14)
AST SERPL W P-5'-P-CCNC: 14 U/L (ref 0–45)
BILIRUB SERPL-MCNC: 0.4 MG/DL (ref 0.2–1.3)
BUN SERPL-MCNC: 14 MG/DL (ref 7–30)
CALCIUM SERPL-MCNC: 9.9 MG/DL (ref 8.5–10.1)
CHLORIDE SERPL-SCNC: 109 MMOL/L (ref 94–109)
CO2 SERPL-SCNC: 22 MMOL/L (ref 20–32)
CREAT SERPL-MCNC: 0.73 MG/DL (ref 0.52–1.04)
GFR SERPL CREATININE-BSD FRML MDRD: 75 ML/MIN/{1.73_M2}
GLUCOSE SERPL-MCNC: 89 MG/DL (ref 70–99)
POTASSIUM SERPL-SCNC: 4.1 MMOL/L (ref 3.4–5.3)
PROT SERPL-MCNC: 7.1 G/DL (ref 6.8–8.8)
SODIUM SERPL-SCNC: 139 MMOL/L (ref 133–144)

## 2019-05-22 ASSESSMENT — ASTHMA QUESTIONNAIRES: ACT_TOTALSCORE: 22

## 2019-06-25 ENCOUNTER — TELEPHONE (OUTPATIENT)
Dept: FAMILY MEDICINE | Facility: CLINIC | Age: 84
End: 2019-06-25
Payer: COMMERCIAL

## 2019-06-25 DIAGNOSIS — Z00.00 HEALTH CARE MAINTENANCE: Primary | ICD-10-CM

## 2019-06-25 PROCEDURE — 99207 C PAF COMPLETED  NO CHARGE: CPT | Performed by: FAMILY MEDICINE

## 2019-06-25 NOTE — TELEPHONE ENCOUNTER
[employer] PAF Project printed and given to PCP or MA for completion at patient's on 05/21/2019 appointment  Routing to Provider to sign EPIC order (pended in this encounter) once appointment is complete  TC to fax to [phone] to process once it has been signed    .Bessie GAN    [facility]

## 2019-07-15 ENCOUNTER — TELEPHONE (OUTPATIENT)
Dept: FAMILY MEDICINE | Facility: CLINIC | Age: 84
End: 2019-07-15

## 2019-07-15 NOTE — TELEPHONE ENCOUNTER
"Kylah is calling to request Malaria and Typhoid pills Rx's. She is going to [place] and [place]. They are going to remote areas- backpacking.  She asked [facility] to look up [facility] recommendation.  She is up to date with Tetanus. Hep A seems out of date to me.  No Hep B.  I recommended travel clinic and she said \"I'm 83 Im not going to drive all the way over there\".  Routing to PCP. I pended pharmacy.      She is traveling August 25th. I pended [facility] pharmacy    Joana Marrero RN- Triage [employer]    "

## 2019-07-15 NOTE — TELEPHONE ENCOUNTER
Travel clinic consult is a good idea, to make sure she gets all the appropriate treatments.   I wonder if she can do a phone call  consult /or E- visit ?

## 2019-07-15 NOTE — TELEPHONE ENCOUNTER
S/w pt and gave Dr. Almanzar's message.  Pt is not happy with the reply.  Gave number to "[employer] in [place] [phone].  Advised want to make sure pt is getting the appropriate dosing of medications and treatment.     Pt will call "[employer]".    Lorelei WILDER RN  EP Triage

## 2019-09-12 LAB
ALT SERPL-CCNC: 10 U/L (ref 0–40)
AST SERPL-CCNC: 12 U/L (ref 0–40)

## 2019-09-14 ENCOUNTER — TRANSFERRED RECORDS (OUTPATIENT)
Dept: HEALTH INFORMATION MANAGEMENT | Facility: CLINIC | Age: 84
End: 2019-09-14

## 2019-09-14 LAB
CREAT SERPL-MCNC: 1.49 MG/DL (ref 0.5–1.5)
POTASSIUM SERPL-SCNC: 3.6 MEQ/L (ref 3.5–5.5)

## 2019-09-24 ENCOUNTER — OFFICE VISIT (OUTPATIENT)
Dept: FAMILY MEDICINE | Facility: CLINIC | Age: 84
End: 2019-09-24
Payer: COMMERCIAL

## 2019-09-24 VITALS
TEMPERATURE: 98.2 F | BODY MASS INDEX: 23.6 KG/M2 | OXYGEN SATURATION: 97 % | SYSTOLIC BLOOD PRESSURE: 122 MMHG | WEIGHT: 125 LBS | HEIGHT: 61 IN | DIASTOLIC BLOOD PRESSURE: 80 MMHG | HEART RATE: 70 BPM

## 2019-09-24 DIAGNOSIS — R19.7 DIARRHEA, UNSPECIFIED TYPE: Primary | ICD-10-CM

## 2019-09-24 LAB
ERYTHROCYTE [DISTWIDTH] IN BLOOD BY AUTOMATED COUNT: 14.3 % (ref 10–15)
HCT VFR BLD AUTO: 37.6 % (ref 35–47)
HGB BLD-MCNC: 12.2 G/DL (ref 11.7–15.7)
MCH RBC QN AUTO: 31 PG (ref 26.5–33)
MCHC RBC AUTO-ENTMCNC: 32.4 G/DL (ref 31.5–36.5)
MCV RBC AUTO: 96 FL (ref 78–100)
PLATELET # BLD AUTO: 365 10E9/L (ref 150–450)
RBC # BLD AUTO: 3.93 10E12/L (ref 3.8–5.2)
WBC # BLD AUTO: 14.2 10E9/L (ref 4–11)

## 2019-09-24 PROCEDURE — 87209 SMEAR COMPLEX STAIN: CPT | Performed by: FAMILY MEDICINE

## 2019-09-24 PROCEDURE — 80053 COMPREHEN METABOLIC PANEL: CPT | Performed by: FAMILY MEDICINE

## 2019-09-24 PROCEDURE — 87506 IADNA-DNA/RNA PROBE TQ 6-11: CPT | Performed by: FAMILY MEDICINE

## 2019-09-24 PROCEDURE — 36415 COLL VENOUS BLD VENIPUNCTURE: CPT | Performed by: FAMILY MEDICINE

## 2019-09-24 PROCEDURE — 87177 OVA AND PARASITES SMEARS: CPT | Performed by: FAMILY MEDICINE

## 2019-09-24 PROCEDURE — 87493 C DIFF AMPLIFIED PROBE: CPT | Performed by: FAMILY MEDICINE

## 2019-09-24 PROCEDURE — 99213 OFFICE O/P EST LOW 20 MIN: CPT | Performed by: FAMILY MEDICINE

## 2019-09-24 PROCEDURE — 85027 COMPLETE CBC AUTOMATED: CPT | Performed by: FAMILY MEDICINE

## 2019-09-24 ASSESSMENT — MIFFLIN-ST. JEOR: SCORE: 959.38

## 2019-09-24 NOTE — PATIENT INSTRUCTIONS
Patient Education     Diarrhea with Uncertain Cause (Adult)    Diarrhea is when stools are loose and watery. This can be caused by:    Viral infections    Bacterial infections    Food poisoning    Parasites    Irritable bowel syndrome (IBS)    Inflammatory bowel diseases such as ulcerative colitis, Crohn's disease, and celiac disease    Food intolerance, such as to lactose, the sugar found in milk and milk products    Reaction to medicines like antibiotics, laxatives, cancer drugs, and antacids  Along with diarrhea, you may also have:    Abdominal pain and cramping    Nausea and vomiting    Loss of bowel control    Fever and chills    Bloody stools  In some cases, antibiotics may help to treat diarrhea. You may have a stool sample test. This is done to see what is causing your diarrhea, and if antibiotics will help treat it. The results of a stool sample test may take up to 2 days. The healthcare provider may not give you antibiotics until he or she has the stool test results.  Diarrhea can cause dehydration. This is the loss of too much water and other fluids from the body. When this occurs, body fluid must be replaced. This can be done with oral rehydration solutions. Oral rehydration solutions are available at drugstores and grocery stores without a prescription. Sports drinks are not the best choice if you are very dehydrated. They have too much sugar and not enough electrolytes.  Home care  Follow all instructions given by your healthcare provider. Rest at home for the next 24 hours, or until you feel better. Avoid caffeine, tobacco, and alcohol. These can make diarrhea, cramping, and pain worse.  If taking medicines:    Over-the-counter nausea and diarrhea medicines are generally OK unless you experience fever or blood stool. Check with your doctor first in those circumstances.    You may use acetaminophen or NSAID medicines like ibuprofen or naproxen to reduce pain and fever. Don t use these if you have  chronic liver or kidney disease, or ever had a stomach ulcer or gastrointestinal bleeding. Don't use NSAID medicines if you are already taking one for another condition (like arthritis) or are on daily aspirin therapy (such as for heart disease or after a stroke). Talk with your healthcare provider first.    If antibiotics were prescribed, be sure you take them until they are finished. Don t stop taking them even when you feel better. Antibiotics must be taken as a full course.  To prevent the spread of illness:    Remember that washing with soap and water and using alcohol-based  is the best way to prevent the spread of infection. Dry your hands with a single use towel (like a paper towel).    Clean the toilet after each use.    Wash your hands before eating.    Wash your hands before and after preparing food. Keep in mind that people with diarrhea or vomiting should not prepare food for others.    Wash your hands after using cutting boards, countertops, and knives that have been in contact with raw foods.    Wash and then peel fruits and vegetables.    Keep uncooked meats away from cooked and ready-to-eat foods.    Use a food thermometer when cooking. Cook poultry to at least 165 F (74 C). Cook ground meat (beef, veal, pork, lamb) to at least 160 F (71 C). Cook fresh beef, veal, lamb, and pork to at least 145 F (63 C).    Don t eat raw or undercooked eggs (poached or jade side up), poultry, meat, or unpasteurized milk and juices.  Food and drinks  The main goal while treating vomiting or diarrhea is to prevent dehydration. This is done by taking small amounts of liquids often.    Keep in mind that liquids are more important than food right now.    Drink only small amounts of liquids at a time.    Don t force yourself to eat, especially if you are having cramping, vomiting, or diarrhea. Don t eat large amounts at a time, even if you are hungry.    If you eat, avoid fatty, greasy, spicy, or fried  foods.    Don t eat dairy foods or drink milk if you have diarrhea. These can make diarrhea worse.  During the first 24 hours you can try:    Oral rehydration solutions.  Sports drinks may be used if you are not too dehydrated and are otherwise healthy.    Soft drinks without caffeine    Ginger ale    Water (plain or flavored)    Decaf tea or coffee    Clear broth, consommé, or bouillon    Gelatin, popsicles, or frozen fruit juice bars  The second 24 hours, if you are feeling better, you can add:    Hot cereal, plain toast, bread, rolls, or crackers    Plain noodles, rice, mashed potatoes, chicken noodle soup, or rice soup    Unsweetened canned fruit (no pineapple)    Bananas  As you recover:    Limit fat intake to less than 15 grams per day. Don t eat margarine, butter, oils, mayonnaise, sauces, gravies, fried foods, peanut butter, meat, poultry, or fish.    Limit fiber. Don t eat raw or cooked vegetables, fresh fruits except bananas, or bran cereals.    Limit caffeine and chocolate.    Limit dairy.    Don t use spices or seasonings except salt.    Go back to your normal diet over time, as you feel better and your symptoms improve.    If the symptoms come back, go back to a simple diet or clear liquids.  Follow-up care  Follow up with your healthcare provider, or as advised. If a stool sample was taken or cultures were done, call the healthcare provider for the results as instructed.  Call 911  Call 911 if you have any of these symptoms:    Trouble breathing    Confusion    Extreme drowsiness or trouble walking    Loss of consciousness    Rapid heart rate    Chest pain    Stiff neck    Seizure  When to seek medical advice  Call your healthcare provider right away if any of these occur:    Abdominal pain that gets worse    Constant lower right abdominal pain    Continued vomiting and inability to keep liquids down    Diarrhea more than 5 times a day    Blood in vomit or stool    Dark urine or no urine for 8 hours,  dry mouth and tongue, tiredness, weakness, or dizziness    Drowsiness    New rash    You don t get better in 2 to 3 days    Fever of 100.4 F (38 C) or higher, or as directed by your healthcare provider  Date Last Reviewed: 6/1/2018 2000-2018 [employer] [address] [place] [address]. All rights reserved. This information is not intended as a substitute for professional medical care. Always follow your healthcare professional's instructions.

## 2019-09-24 NOTE — PROGRESS NOTES
Subjective     Kylah Carrasquillo is a 83 year old female who presents to clinic today for the following health issues:    [facility]       Hospital Follow-up Visit:    Hospital/Nursing Home/IP Rehab Facility: [facility] in [facility]   Date of Admission: 9/12/2019  Date of Discharge: 9/14/2019  Reason(s) for Admission: Parasite  Diarrhea, Nausea and dehydration          She was travelling to [facility] and [facility]. She got sick with  GI sx mostly diarrhea, vomiting.         She initially took Cipro although she did not improve.  As her sx got worse , she had to be hospitalized and was diagnosed with parasite infection so she  was treated  with flagyl and  Raberprazole x 5 day. She was also given iv fluids etc. She thinks she was treated well. She felt better after  the treatment. She just got back few  days ago. She finished treatment few days ago. She felt better after taking med's . But  she thinks  diarrhea may be coming back since  last night.           Had 4-5 loose bm, no blood or mucous. No associated abdominal pain. No nausea vomiting so far.          No fever or chills .             Problems taking medications regularly:  none         Medication changes since discharge: Flagyl domperidone, Rabeprazole, electrolyte powder       Problems adhering to non-medication therapy:  None    Summary of hospitalization:  Discharge summary unavailable  Diagnostic Tests/Treatments reviewed.  Follow up needed:   Other Healthcare Providers Involved in Patient s Care:         None  Update since discharge: improved. Initially but concerned with sx coming back again since last night     Post Discharge Medication Reconciliation: discharge medications reconciled, continue medications without change.  Plan of care communicated with patient     Coding guidelines for this visit:  Type of Medical   Decision Making Face-to-Face Visit       within 7 Days of discharge Face-to-Face Visit        within 14 days of discharge    Moderate Complexity [id number]   High Complexity [id number]            Diarrhea      Duration: ongoing     Description:       Consistency of stool: loose, formed and yellow       Blood in stool: no        Number of loose stools past 24 hours: 9 times     Intensity:  mild, moderate    Accompanying signs and symptoms: was recently treated for dehydration        Fever: no        Nausea/vomiting: no        Abdominal pain: no        Weight loss: YES    History (recent antibiotics or travel/ill contacts/med changes/testing done): Yes recent travel to [facility]      Precipitating or alleviating factors: None    Therapies tried and outcome: none       Reviewed and updated as needed this visit by Provider         Review of Systems   ROS COMP: Constitutional, HEENT, cardiovascular, pulmonary, GI, , musculoskeletal, neuro, skin, endocrine and psych systems are negative, except as otherwise noted.      Objective    There were no vitals taken for this visit.  There is no height or weight on file to calculate BMI.  Physical Exam   GENERAL: healthy, alert and no distress  HENT: oropharynx clear and oral mucous membranes moist  NECK: no adenopathy,  RESP: lungs clear to auscultation - no rales, rhonchi or wheezes  CV: regular rate and rhythm, normal S1 S2, no S3 or S4,   ABDOMEN: soft, nontender, no hepatosplenomegaly, no masses and bowel sounds normal              Assessment & Plan      Diarrhea, unspecified type    - Clostridium difficile toxin B PCR; Future  - Enteric Bacteria and Virus Panel by JENNIE Stool; Future  - Ova and Parasite Exam Routine; Future  - Comprehensive metabolic panel  - CBC with platelets    Pt with recent travel hx and recent antibiotic use.       Discussed possible differential diagnosis of  her symptoms/ diarrhea,  C diff vs infectious diarrhea.     Gave containers for stool, test        Talked about hydration, fluids, brat diet etc. Talked about warning s/s for which should be seen urgently      She  will f/u if problem       Azucena Almanzar MD  [facility] [place]

## 2019-09-25 DIAGNOSIS — A04.72 C. DIFFICILE COLITIS: Primary | ICD-10-CM

## 2019-09-25 DIAGNOSIS — R19.7 DIARRHEA, UNSPECIFIED TYPE: ICD-10-CM

## 2019-09-25 LAB
ALBUMIN SERPL-MCNC: 3.3 G/DL (ref 3.4–5)
ALP SERPL-CCNC: 45 U/L (ref 40–150)
ALT SERPL W P-5'-P-CCNC: 21 U/L (ref 0–50)
ANION GAP SERPL CALCULATED.3IONS-SCNC: 7 MMOL/L (ref 3–14)
AST SERPL W P-5'-P-CCNC: 18 U/L (ref 0–45)
BILIRUB SERPL-MCNC: 0.4 MG/DL (ref 0.2–1.3)
BUN SERPL-MCNC: 16 MG/DL (ref 7–30)
C DIFF TOX B STL QL: POSITIVE
CALCIUM SERPL-MCNC: 8.8 MG/DL (ref 8.5–10.1)
CHLORIDE SERPL-SCNC: 110 MMOL/L (ref 94–109)
CO2 SERPL-SCNC: 22 MMOL/L (ref 20–32)
CREAT SERPL-MCNC: 0.98 MG/DL (ref 0.52–1.04)
GFR SERPL CREATININE-BSD FRML MDRD: 53 ML/MIN/{1.73_M2}
GLUCOSE SERPL-MCNC: 80 MG/DL (ref 70–99)
POTASSIUM SERPL-SCNC: 3.6 MMOL/L (ref 3.4–5.3)
PROT SERPL-MCNC: 6.9 G/DL (ref 6.8–8.8)
SODIUM SERPL-SCNC: 139 MMOL/L (ref 133–144)
SPECIMEN SOURCE: ABNORMAL

## 2019-09-25 RX ORDER — VANCOMYCIN HYDROCHLORIDE 125 MG/1
125 CAPSULE ORAL 4 TIMES DAILY
Qty: 40 CAPSULE | Refills: 0 | Status: SHIPPED | OUTPATIENT
Start: 2019-09-25 | End: 2019-10-08

## 2019-09-25 ASSESSMENT — ASTHMA QUESTIONNAIRES: ACT_TOTALSCORE: 22

## 2019-09-26 LAB
C COLI+JEJUNI+LARI FUSA STL QL NAA+PROBE: NOT DETECTED
EC STX1 GENE STL QL NAA+PROBE: NOT DETECTED
EC STX2 GENE STL QL NAA+PROBE: NOT DETECTED
ENTERIC PATHOGEN COMMENT: NORMAL
NOROV GI+II ORF1-ORF2 JNC STL QL NAA+PR: NOT DETECTED
O+P STL MICRO: NORMAL
RVA NSP5 STL QL NAA+PROBE: NOT DETECTED
SALMONELLA SP RPOD STL QL NAA+PROBE: NOT DETECTED
SHIGELLA SP+EIEC IPAH STL QL NAA+PROBE: NOT DETECTED
SPECIMEN SOURCE: NORMAL
V CHOL+PARA RFBL+TRKH+TNAA STL QL NAA+PR: NOT DETECTED
Y ENTERO RECN STL QL NAA+PROBE: NOT DETECTED

## 2019-09-30 ENCOUNTER — TELEPHONE (OUTPATIENT)
Dept: FAMILY MEDICINE | Facility: CLINIC | Age: 84
End: 2019-09-30

## 2019-09-30 NOTE — TELEPHONE ENCOUNTER
I guess not sure what the rash might be? If hydrocortisone and zyrtec,  Helps  then no problem continuing med's . May need to be seen if rash not improving

## 2019-09-30 NOTE — TELEPHONE ENCOUNTER
Reason for Call:  Other call back    Detailed comments: Pt states she has questions.  She states Dr Almanzar told her if she every has questions she can call.  Pt has redness and itching in stomach area    Phone Number Patient can be reached at: Home number on file [phone] (home)    Best Time: anytime    Can we leave a detailed message on this number? YES    Call taken on 9/30/2019 at 10:34 AM by Lacey Pena

## 2019-09-30 NOTE — TELEPHONE ENCOUNTER
S/w pt and gave Dr. Almanzar's reply below.  Pt will try the hydrocortisone and claritin for 2-3 days and if no improvement will call for an appt.  If the rash is getting worse will call sooner for an appt.  Will continue the vancomycin for now.      Pt states understanding.    Lorelei WILDER RN  EP Triage

## 2019-09-30 NOTE — TELEPHONE ENCOUNTER
Pt calling is on vancomycin for c-diff that she started on 9/26/19.  Pt states the abx is helping her but she is itchy under her breasts along the bra line and on her back. Last night the area was red but is not red today.  Denies hives.  Started her allergy medication loratadine yesterday and states has been off the medication for a month.  Read the itchiness can be a side effect of the medication.  Pt states the itchiness is mild and she is able to tolerate.      Pt does not want to stop the vancomycin since it is helping her sxs of c-diff.  Wondering what she can or should do?    Nurse advised to apply hydrocortisone cream to the itchy areas but to hold off on abx until md replies.  Should pt take allergy medication bid?    Pt can be reached at [phone].    Pharmacy pended.    Lorelei WILDER RN  EP Triage

## 2019-10-08 ENCOUNTER — OFFICE VISIT (OUTPATIENT)
Dept: FAMILY MEDICINE | Facility: CLINIC | Age: 84
End: 2019-10-08
Payer: COMMERCIAL

## 2019-10-08 VITALS
TEMPERATURE: 98.3 F | HEIGHT: 61 IN | WEIGHT: 121 LBS | BODY MASS INDEX: 22.84 KG/M2 | DIASTOLIC BLOOD PRESSURE: 78 MMHG | SYSTOLIC BLOOD PRESSURE: 136 MMHG | HEART RATE: 63 BPM | OXYGEN SATURATION: 96 %

## 2019-10-08 DIAGNOSIS — R21 SKIN RASH: ICD-10-CM

## 2019-10-08 DIAGNOSIS — L85.3 DRY SKIN: ICD-10-CM

## 2019-10-08 DIAGNOSIS — A04.72 C. DIFFICILE COLITIS: Primary | ICD-10-CM

## 2019-10-08 DIAGNOSIS — J45.30 MILD PERSISTENT ASTHMA WITHOUT COMPLICATION: ICD-10-CM

## 2019-10-08 PROCEDURE — 99214 OFFICE O/P EST MOD 30 MIN: CPT | Performed by: FAMILY MEDICINE

## 2019-10-08 RX ORDER — TIOTROPIUM BROMIDE 18 UG/1
CAPSULE ORAL; RESPIRATORY (INHALATION)
Qty: 90 CAPSULE | Refills: 1 | Status: SHIPPED | OUTPATIENT
Start: 2019-10-08 | End: 2019-11-05

## 2019-10-08 RX ORDER — CETIRIZINE HYDROCHLORIDE 10 MG/1
10 TABLET ORAL DAILY
Qty: 30 TABLET | Refills: 0 | Status: SHIPPED | OUTPATIENT
Start: 2019-10-08 | End: 2020-04-17

## 2019-10-08 ASSESSMENT — MIFFLIN-ST. JEOR: SCORE: 941.23

## 2019-10-08 NOTE — PROGRESS NOTES
Subjective     Kylah Carrasquillo is a 83 year old female who presents to clinic today for the following health issues:    HPI   Concern - Recheck on C-diff    Onset: LOV- 9/24/2019     Description: had +ave C diff. Treated with vancomycin. Finished the abx on Sunday AM   Bm have been more formed . Mild stomach ace feeling sometimes . No nausea vomiting. Bm 2-3/ day more formed .   Her diet is different although she is doing better and eating better,  appetite still not normal   Currently on soft diet and wondering when she can continue regular diet       Add on problem     But still having the rash and itching under breast few weeks  and back area         PROBLEMS TO ADD ON...    Asthma Follow-Up    Was ACT completed today?  No    Need refill on her inhaler , doing well on her current med's. Has known asthma and seasonal allergies, not taking Claritin as much as sx are well controlled     Patient Active Problem List   Diagnosis     Mild intermittent asthma     Cardiovascular disease     Raynaud's syndrome     Advanced directives, counseling/discussion     Osteoporosis     Hyperlipidemia LDL goal <100     First degree AV block     Cataracta     Hallux valgus, acquired     Arthritis of right foot     S/P hysterectomy     Overweight (BMI 25.0-29.9)     Past Surgical History:   Procedure Laterality Date     CYSTOCELE REPAIR  1970's     HEMORRHOIDECTOMY  1970's     HERNIA REPAIR, UMBILICAL  1970's     HYSTERECTOMY, VAGINAL  1988    menorrhagia     PHACOEMULSIFICATION CLEAR CORNEA WITH STANDARD INTRAOCULAR LENS IMPLANT  8/7/2014    Procedure: PHACOEMULSIFICATION CLEAR CORNEA WITH STANDARD INTRAOCULAR LENS IMPLANT;  Surgeon: Austin Garcia MD;  Location: [facility]     PHACOEMULSIFICATION CLEAR CORNEA WITH STANDARD INTRAOCULAR LENS IMPLANT Left 9/25/2014    Procedure: PHACOEMULSIFICATION CLEAR CORNEA WITH STANDARD INTRAOCULAR LENS IMPLANT;  Surgeon: Austin Garcia MD;  Location: [facility]     TONSILLECTOMY  1945      TUBAL LIGATION         Social History     Tobacco Use     Smoking status: Former Smoker     Last attempt to quit: 11/15/1990     Years since quittin.9     Smokeless tobacco: Never Used   Substance Use Topics     Alcohol use: Yes     Alcohol/week: 0.0 standard drinks     Comment: occassional     Family History   Problem Relation Age of Onset     Asthma Mother      Osteoporosis Mother      C.A.D. Father      Diabetes Daughter      Thyroid Disease Daughter      C.A.D. Brother      C.A.D. Brother      Thyroid Disease Sister      Diabetes Brother      Hypertension No family hx of      Breast Cancer No family hx of      Cancer - colorectal No family hx of      Anesthesia Reaction No family hx of      Blood Disease No family hx of      Eye Disorder No family hx of            Reviewed and updated as needed this visit by Provider         Review of Systems   ROS COMP: Constitutional, HEENT, cardiovascular, pulmonary, GI, , musculoskeletal, neuro, skin, endocrine and psych systems are negative, except as otherwise noted.      Objective    There were no vitals taken for this visit.  There is no height or weight on file to calculate BMI.  Physical Exam   GENERAL: healthy, alert and no distress  HENT: oropharynx clear and oral mucous membranes moist  NECK: no adenopathy  RESP: lungs clear to auscultation - no rales, rhonchi or wheezes  CV: regular rate and rhythm, normal S1 S2, no S3 or S4,  ABDOMEN: soft, nontender, no hepatosplenomegaly, no masses and bowel sounds normal  MS: no edema  SKIN: no suspicious lesions or rashes at this times except few erythematous scratch marks on back and lower abdominal wall,has slightly dry skin as well . Shows +ve dermographism             Assessment & Plan     (A04.72) C. difficile colitis  (primary encounter diagnosis)  Comment: improved after  treatment with vancomycin   Plan: she  Comfortable watching as she has improved, talked about diet/ hydration. Also  probiotic use  etc to prevent problem .    She will  follow up if problem      (J45.30) Mild persistent asthma without complication  Comment: also has some seasonal allergies/ stable, need refill   Plan: tiotropium (SPIRIVA HANDIHALER) 18 MCG inhaled         capsule, cetirizine (ZYRTEC) 10 MG tablet            (R21) Skin rash  Comment: likely part of her allergies/ hives or skin dryness   Plan: cetirizine (ZYRTEC) 10 MG tablet           Willing t try zyrtec to see if helps. Follow up if no improvement or ongoing problem, consider further evaluation if needed     (L85.3) Dry skin  Comment:   Plan: cetirizine (ZYRTEC) 10 MG tablet        Skin cares and symptomatic treatment discussed follow up if problem         Script sent.Cares and  treatment discussed follow. up if problem   Patient expressed understanding and agreement with treatment plan. All patient's questions were answered, will let me know if has more later.  Medications: Rx's: Reviewed the potential side effects/complications of medications prescribed.     Azucena Almanzar MD  [facility]

## 2019-11-05 ENCOUNTER — OFFICE VISIT (OUTPATIENT)
Dept: FAMILY MEDICINE | Facility: CLINIC | Age: 84
End: 2019-11-05
Payer: COMMERCIAL

## 2019-11-05 VITALS
HEART RATE: 73 BPM | DIASTOLIC BLOOD PRESSURE: 76 MMHG | BODY MASS INDEX: 23.03 KG/M2 | SYSTOLIC BLOOD PRESSURE: 128 MMHG | WEIGHT: 122 LBS | HEIGHT: 61 IN | TEMPERATURE: 97.8 F | OXYGEN SATURATION: 98 %

## 2019-11-05 DIAGNOSIS — I73.00 RAYNAUD'S DISEASE WITHOUT GANGRENE: ICD-10-CM

## 2019-11-05 DIAGNOSIS — R19.7 DIARRHEA, UNSPECIFIED TYPE: Primary | ICD-10-CM

## 2019-11-05 DIAGNOSIS — J45.30 MILD PERSISTENT ASTHMA WITHOUT COMPLICATION: ICD-10-CM

## 2019-11-05 LAB
ANION GAP SERPL CALCULATED.3IONS-SCNC: 7 MMOL/L (ref 3–14)
BUN SERPL-MCNC: 12 MG/DL (ref 7–30)
CALCIUM SERPL-MCNC: 8.9 MG/DL (ref 8.5–10.1)
CHLORIDE SERPL-SCNC: 109 MMOL/L (ref 94–109)
CO2 SERPL-SCNC: 24 MMOL/L (ref 20–32)
CREAT SERPL-MCNC: 0.82 MG/DL (ref 0.52–1.04)
GFR SERPL CREATININE-BSD FRML MDRD: 66 ML/MIN/{1.73_M2}
GLUCOSE SERPL-MCNC: 87 MG/DL (ref 70–99)
POTASSIUM SERPL-SCNC: 4 MMOL/L (ref 3.4–5.3)
SODIUM SERPL-SCNC: 140 MMOL/L (ref 133–144)

## 2019-11-05 PROCEDURE — 36415 COLL VENOUS BLD VENIPUNCTURE: CPT | Performed by: FAMILY MEDICINE

## 2019-11-05 PROCEDURE — 99214 OFFICE O/P EST MOD 30 MIN: CPT | Performed by: FAMILY MEDICINE

## 2019-11-05 PROCEDURE — 80048 BASIC METABOLIC PNL TOTAL CA: CPT | Performed by: FAMILY MEDICINE

## 2019-11-05 RX ORDER — ALBUTEROL SULFATE 90 UG/1
2 AEROSOL, METERED RESPIRATORY (INHALATION) 4 TIMES DAILY PRN
Qty: 2 INHALER | Refills: 1 | Status: SHIPPED | OUTPATIENT
Start: 2019-11-05 | End: 2020-08-13

## 2019-11-05 RX ORDER — FLUTICASONE PROPIONATE 110 UG/1
2 AEROSOL, METERED RESPIRATORY (INHALATION) 2 TIMES DAILY
Qty: 3 INHALER | Refills: 1 | Status: SHIPPED | OUTPATIENT
Start: 2019-11-05 | End: 2020-06-25

## 2019-11-05 RX ORDER — AMLODIPINE BESYLATE 2.5 MG/1
2.5 TABLET ORAL DAILY
Qty: 90 TABLET | Refills: 3 | Status: SHIPPED | OUTPATIENT
Start: 2019-11-05 | End: 2020-08-13

## 2019-11-05 RX ORDER — TIOTROPIUM BROMIDE 18 UG/1
CAPSULE ORAL; RESPIRATORY (INHALATION)
Qty: 90 CAPSULE | Refills: 1 | Status: SHIPPED | OUTPATIENT
Start: 2019-11-05 | End: 2020-08-13

## 2019-11-05 ASSESSMENT — MIFFLIN-ST. JEOR: SCORE: 945.77

## 2019-11-05 NOTE — LETTER
November 11, 2019      Kylah Carrasquillo  [id number] [facility] [id number]        Dear ,    We are writing to inform you of your test results.    Normal  liver function tests, kidney functions, glucose and  electrolytes(various salts in your body)     Resulted Orders   Basic metabolic panel  (Ca, Cl, CO2, Creat, Gluc, K, Na, BUN)   Result Value Ref Range    Sodium 140 133 - 144 mmol/L    Potassium 4.0 3.4 - 5.3 mmol/L    Chloride 109 94 - 109 mmol/L    Carbon Dioxide 24 20 - 32 mmol/L    Anion Gap 7 3 - 14 mmol/L    Glucose 87 70 - 99 mg/dL    Urea Nitrogen 12 7 - 30 mg/dL    Creatinine 0.82 0.52 - 1.04 mg/dL    GFR Estimate 66 >60 mL/min/[1.73_m2]      Comment:      Non  GFR Calc  Starting 12/18/2018, serum creatinine based estimated GFR (eGFR) will be   calculated using the Chronic Kidney Disease Epidemiology Collaboration   (CKD-EPI) equation.      GFR Estimate If Black 77 >60 mL/min/[1.73_m2]      Comment:       GFR Calc  Starting 12/18/2018, serum creatinine based estimated GFR (eGFR) will be   calculated using the Chronic Kidney Disease Epidemiology Collaboration   (CKD-EPI) equation.      Calcium 8.9 8.5 - 10.1 mg/dL       If you have any questions or concerns, please call the clinic at the number listed above.       Sincerely,        Azucena Almanzar MD

## 2019-11-05 NOTE — PROGRESS NOTES
Subjective     Kylah Carrasquillo is a 83 year old female who presents to clinic today for the following health issues:    HPI   Diarrhea       Duration: x Saturday, Sunday and Monday     Description:       Consistency of stool: runny, explosive, yellowish        Blood in stool: no        Number of loose stools past 24 hours: 3     Intensity:  mild, moderate    Accompanying signs and symptoms: recently was treated for C-diff and felt better, so she is worried.     Her sx have improved somewhat and no bm since this  am but just worried and wanted to get checked again       Fever: no        Nausea/vomiting: no ,is able  to take fluids ok , also taking OTC probiotic        Abdominal pain: no        Weight loss: no     History (recent antibiotics or travel/ill contacts/med changes/testing done): recently had C-diff     Precipitating or alleviating factors: None    Therapies tried and outcome: Imodium right ear      add on problem   Has hx of raynaud and Norvasc has helped with that. She would like a refill. Denies any other concerning sx or problem taking med's otherwise    Patient Active Problem List   Diagnosis     Mild intermittent asthma     Cardiovascular disease     Raynaud's syndrome     Advanced directives, counseling/discussion     Osteoporosis     Hyperlipidemia LDL goal <100     First degree AV block     Cataracta     Hallux valgus, acquired     Arthritis of right foot     S/P hysterectomy     Overweight (BMI 25.0-29.9)     Past Surgical History:   Procedure Laterality Date     CYSTOCELE REPAIR  1970's     HEMORRHOIDECTOMY  1970's     HERNIA REPAIR, UMBILICAL  1970's     HYSTERECTOMY, VAGINAL  1988    menorrhagia     PHACOEMULSIFICATION CLEAR CORNEA WITH STANDARD INTRAOCULAR LENS IMPLANT  8/7/2014    Procedure: PHACOEMULSIFICATION CLEAR CORNEA WITH STANDARD INTRAOCULAR LENS IMPLANT;  Surgeon: Austin Garcia MD;  Location: [facility]     PHACOEMULSIFICATION CLEAR CORNEA WITH STANDARD INTRAOCULAR LENS  IMPLANT Left 2014    Procedure: PHACOEMULSIFICATION CLEAR CORNEA WITH STANDARD INTRAOCULAR LENS IMPLANT;  Surgeon: Austin Garcia MD;  Location:  [facility]     TONSILLECTOMY       TUBAL LIGATION         Social History     Tobacco Use     Smoking status: Former Smoker     Last attempt to quit: 11/15/1990     Years since quittin.9     Smokeless tobacco: Never Used   Substance Use Topics     Alcohol use: Yes     Alcohol/week: 0.0 standard drinks     Comment: occassional     Family History   Problem Relation Age of Onset     Asthma Mother      Osteoporosis Mother      C.A.D. Father      Diabetes Daughter      Thyroid Disease Daughter      C.A.D. Brother      C.A.D. Brother      Thyroid Disease Sister      Diabetes Brother      Hypertension No family hx of      Breast Cancer No family hx of      Cancer - colorectal No family hx of      Anesthesia Reaction No family hx of      Blood Disease No family hx of      Eye Disorder No family hx of            Reviewed and updated as needed this visit by Provider         Review of Systems   ROS COMP: Constitutional, HEENT, cardiovascular, pulmonary, GI, , musculoskeletal, neuro, skin, endocrine and psych systems are negative, except as otherwise noted.      Objective    There were no vitals taken for this visit.  There is no height or weight on file to calculate BMI.  Physical Exam   GENERAL: healthy, alert and no distress  EYES: Eyes grossly normal to inspection,   HENT:  mouth without ulcers or lesions  NECK: no adenopathy, no asymmetry, masses, or scars and thyroid normal to palpation  RESP: lungs clear to auscultation - no rales, rhonchi or wheeze  CV: regular rate and rhythm, normal S1 S2, no peripheral edema.   ABDOMEN: soft, nontender, no hepatosplenomegaly, no masses and bowel sounds normal            Assessment & Plan     (R19.7) Diarrhea, unspecified type  (primary encounter diagnosis)  Comment:   Plan: Basic metabolic panel  (Ca, Cl, CO2, Creat,          Gluc, K, Na, BUN), Clostridium difficile toxin         B PCR            Discussed possible differential diagnosis for her symptoms/ diarrhea. Mostly worried about c diff.  Clinically she is doing ok. Talked about hydration, fluids, brat diet etc. Talked about warning s/s for which should be seen. Check  Stool for c diff.  test, and I treat if needed She will f/u if problem       (I73.00) Raynaud's disease without gangrene  Comment: stable   Plan: amLODIPine (NORVASC) 2.5 MG tablet, Basic         metabolic panel  (Ca, Cl, CO2, Creat, Gluc, K,         Na, BUN)               Check labs. refill sent.Cares and  treatment discussed. follow up if problem   Patient expressed understanding and agreement with treatment plan. All patient's questions were answered, will let me know if has more later.  Medications: Rx's: Reviewed the potential side effects/complications of medications prescribed.     Azucena Almanzar MD  [facility] [place]

## 2019-11-05 NOTE — PATIENT INSTRUCTIONS
Patient Education     Diarrhea with Uncertain Cause (Adult)    Diarrhea is when stools are loose and watery. This can be caused by:    Viral infections    Bacterial infections    Food poisoning    Parasites    Irritable bowel syndrome (IBS)    Inflammatory bowel diseases such as ulcerative colitis, Crohn's disease, and celiac disease    Food intolerance, such as to lactose, the sugar found in milk and milk products    Reaction to medicines like antibiotics, laxatives, cancer drugs, and antacids  Along with diarrhea, you may also have:    Abdominal pain and cramping    Nausea and vomiting    Loss of bowel control    Fever and chills    Bloody stools  In some cases, antibiotics may help to treat diarrhea. You may have a stool sample test. This is done to see what is causing your diarrhea, and if antibiotics will help treat it. The results of a stool sample test may take up to 2 days. The healthcare provider may not give you antibiotics until he or she has the stool test results.  Diarrhea can cause dehydration. This is the loss of too much water and other fluids from the body. When this occurs, body fluid must be replaced. This can be done with oral rehydration solutions. Oral rehydration solutions are available at drugstores and grocery stores without a prescription. Sports drinks are not the best choice if you are very dehydrated. They have too much sugar and not enough electrolytes.  Home care  Follow all instructions given by your healthcare provider. Rest at home for the next 24 hours, or until you feel better. Avoid caffeine, tobacco, and alcohol. These can make diarrhea, cramping, and pain worse.  If taking medicines:    Over-the-counter nausea and diarrhea medicines are generally OK unless you experience fever or blood stool. Check with your doctor first in those circumstances.    You may use acetaminophen or NSAID medicines like ibuprofen or naproxen to reduce pain and fever. Don t use these if you have  chronic liver or kidney disease, or ever had a stomach ulcer or gastrointestinal bleeding. Don't use NSAID medicines if you are already taking one for another condition (like arthritis) or are on daily aspirin therapy (such as for heart disease or after a stroke). Talk with your healthcare provider first.    If antibiotics were prescribed, be sure you take them until they are finished. Don t stop taking them even when you feel better. Antibiotics must be taken as a full course.  To prevent the spread of illness:    Remember that washing with soap and water and using alcohol-based  is the best way to prevent the spread of infection. Dry your hands with a single use towel (like a paper towel).    Clean the toilet after each use.    Wash your hands before eating.    Wash your hands before and after preparing food. Keep in mind that people with diarrhea or vomiting should not prepare food for others.    Wash your hands after using cutting boards, countertops, and knives that have been in contact with raw foods.    Wash and then peel fruits and vegetables.    Keep uncooked meats away from cooked and ready-to-eat foods.    Use a food thermometer when cooking. Cook poultry to at least 165 F (74 C). Cook ground meat (beef, veal, pork, lamb) to at least 160 F (71 C). Cook fresh beef, veal, lamb, and pork to at least 145 F (63 C).    Don t eat raw or undercooked eggs (poached or jade side up), poultry, meat, or unpasteurized milk and juices.  Food and drinks  The main goal while treating vomiting or diarrhea is to prevent dehydration. This is done by taking small amounts of liquids often.    Keep in mind that liquids are more important than food right now.    Drink only small amounts of liquids at a time.    Don t force yourself to eat, especially if you are having cramping, vomiting, or diarrhea. Don t eat large amounts at a time, even if you are hungry.    If you eat, avoid fatty, greasy, spicy, or fried  foods.    Don t eat dairy foods or drink milk if you have diarrhea. These can make diarrhea worse.  During the first 24 hours you can try:    Oral rehydration solutions.  Sports drinks may be used if you are not too dehydrated and are otherwise healthy.    Soft drinks without caffeine    Ginger ale    Water (plain or flavored)    Decaf tea or coffee    Clear broth, consommé, or bouillon    Gelatin, popsicles, or frozen fruit juice bars  The second 24 hours, if you are feeling better, you can add:    Hot cereal, plain toast, bread, rolls, or crackers    Plain noodles, rice, mashed potatoes, chicken noodle soup, or rice soup    Unsweetened canned fruit (no pineapple)    Bananas  As you recover:    Limit fat intake to less than 15 grams per day. Don t eat margarine, butter, oils, mayonnaise, sauces, gravies, fried foods, peanut butter, meat, poultry, or fish.    Limit fiber. Don t eat raw or cooked vegetables, fresh fruits except bananas, or bran cereals.    Limit caffeine and chocolate.    Limit dairy.    Don t use spices or seasonings except salt.    Go back to your normal diet over time, as you feel better and your symptoms improve.    If the symptoms come back, go back to a simple diet or clear liquids.  Follow-up care  Follow up with your healthcare provider, or as advised. If a stool sample was taken or cultures were done, call the healthcare provider for the results as instructed.  Call 911  Call 911 if you have any of these symptoms:    Trouble breathing    Confusion    Extreme drowsiness or trouble walking    Loss of consciousness    Rapid heart rate    Chest pain    Stiff neck    Seizure  When to seek medical advice  Call your healthcare provider right away if any of these occur:    Abdominal pain that gets worse    Constant lower right abdominal pain    Continued vomiting and inability to keep liquids down    Diarrhea more than 5 times a day    Blood in vomit or stool    Dark urine or no urine for 8 hours,  dry mouth and tongue, tiredness, weakness, or dizziness    Drowsiness    New rash    You don t get better in 2 to 3 days    Fever of 100.4 F (38 C) or higher, or as directed by your healthcare provider  Date Last Reviewed: 6/1/2018 2000-2018 [employer]. [address] [place] [id number]. All rights reserved. This information is not intended as a substitute for professional medical care. Always follow your healthcare professional's instructions.

## 2019-11-06 DIAGNOSIS — R19.7 DIARRHEA, UNSPECIFIED TYPE: ICD-10-CM

## 2019-11-06 LAB
C DIFF TOX B STL QL: POSITIVE
SPECIMEN SOURCE: ABNORMAL

## 2019-11-06 PROCEDURE — 87493 C DIFF AMPLIFIED PROBE: CPT | Performed by: FAMILY MEDICINE

## 2019-11-06 ASSESSMENT — ASTHMA QUESTIONNAIRES: ACT_TOTALSCORE: 23

## 2019-11-07 DIAGNOSIS — A04.72 C. DIFFICILE COLITIS: Primary | ICD-10-CM

## 2019-11-07 RX ORDER — METRONIDAZOLE 500 MG/1
500 TABLET ORAL 3 TIMES DAILY
Qty: 21 TABLET | Refills: 0 | Status: SHIPPED | OUTPATIENT
Start: 2019-11-07 | End: 2020-01-13

## 2019-11-20 ENCOUNTER — ALLIED HEALTH/NURSE VISIT (OUTPATIENT)
Dept: NURSING | Facility: CLINIC | Age: 84
End: 2019-11-20
Payer: COMMERCIAL

## 2019-11-20 DIAGNOSIS — Z23 NEED FOR PROPHYLACTIC VACCINATION AND INOCULATION AGAINST INFLUENZA: Primary | ICD-10-CM

## 2019-11-20 PROCEDURE — G0008 ADMIN INFLUENZA VIRUS VAC: HCPCS

## 2019-11-20 PROCEDURE — 99207 ZZC NO CHARGE NURSE ONLY: CPT

## 2019-11-20 PROCEDURE — 90662 IIV NO PRSV INCREASED AG IM: CPT

## 2020-01-13 ENCOUNTER — OFFICE VISIT (OUTPATIENT)
Dept: FAMILY MEDICINE | Facility: CLINIC | Age: 85
End: 2020-01-13
Payer: COMMERCIAL

## 2020-01-13 VITALS
WEIGHT: 124 LBS | TEMPERATURE: 97.8 F | HEIGHT: 61 IN | DIASTOLIC BLOOD PRESSURE: 80 MMHG | OXYGEN SATURATION: 98 % | BODY MASS INDEX: 23.41 KG/M2 | SYSTOLIC BLOOD PRESSURE: 122 MMHG | HEART RATE: 68 BPM

## 2020-01-13 DIAGNOSIS — R19.7 DIARRHEA, UNSPECIFIED TYPE: Primary | ICD-10-CM

## 2020-01-13 PROCEDURE — 99213 OFFICE O/P EST LOW 20 MIN: CPT | Performed by: FAMILY MEDICINE

## 2020-01-13 ASSESSMENT — MIFFLIN-ST. JEOR: SCORE: 949.84

## 2020-01-13 NOTE — PATIENT INSTRUCTIONS
Patient Education     Diarrhea with Uncertain Cause (Adult)    Diarrhea is when stools are loose and watery. This can be caused by:    Viral infections    Bacterial infections    Food poisoning    Parasites    Irritable bowel syndrome (IBS)    Inflammatory bowel diseases such as ulcerative colitis, Crohn's disease, and celiac disease    Food intolerance, such as to lactose, the sugar found in milk and milk products    Reaction to medicines like antibiotics, laxatives, cancer drugs, and antacids  Along with diarrhea, you may also have:    Abdominal pain and cramping    Nausea and vomiting    Loss of bowel control    Fever and chills    Bloody stools  In some cases, antibiotics may help to treat diarrhea. You may have a stool sample test. This is done to see what is causing your diarrhea, and if antibiotics will help treat it. The results of a stool sample test may take up to 2 days. The healthcare provider may not give you antibiotics until he or she has the stool test results.  Diarrhea can cause dehydration. This is the loss of too much water and other fluids from the body. When this occurs, body fluid must be replaced. This can be done with oral rehydration solutions. Oral rehydration solutions are available at drugstores and grocery stores without a prescription. Sports drinks are not the best choice if you are very dehydrated. They have too much sugar and not enough electrolytes.  Home care  Follow all instructions given by your healthcare provider. Rest at home for the next 24 hours, or until you feel better. Avoid caffeine, tobacco, and alcohol. These can make diarrhea, cramping, and pain worse.  If taking medicines:    Over-the-counter nausea and diarrhea medicines are generally OK unless you experience fever or blood stool. Check with your doctor first in those circumstances.    You may use acetaminophen or NSAID medicines like ibuprofen or naproxen to reduce pain and fever. Don t use these if you have  chronic liver or kidney disease, or ever had a stomach ulcer or gastrointestinal bleeding. Don't use NSAID medicines if you are already taking one for another condition (like arthritis) or are on daily aspirin therapy (such as for heart disease or after a stroke). Talk with your healthcare provider first.    If antibiotics were prescribed, be sure you take them until they are finished. Don t stop taking them even when you feel better. Antibiotics must be taken as a full course.  To prevent the spread of illness:    Remember that washing with soap and water and using alcohol-based  is the best way to prevent the spread of infection. Dry your hands with a single use towel (like a paper towel).    Clean the toilet after each use.    Wash your hands before eating.    Wash your hands before and after preparing food. Keep in mind that people with diarrhea or vomiting should not prepare food for others.    Wash your hands after using cutting boards, countertops, and knives that have been in contact with raw foods.    Wash and then peel fruits and vegetables.    Keep uncooked meats away from cooked and ready-to-eat foods.    Use a food thermometer when cooking. Cook poultry to at least 165 F (74 C). Cook ground meat (beef, veal, pork, lamb) to at least 160 F (71 C). Cook fresh beef, veal, lamb, and pork to at least 145 F (63 C).    Don t eat raw or undercooked eggs (poached or jade side up), poultry, meat, or unpasteurized milk and juices.  Food and drinks  The main goal while treating vomiting or diarrhea is to prevent dehydration. This is done by taking small amounts of liquids often.    Keep in mind that liquids are more important than food right now.    Drink only small amounts of liquids at a time.    Don t force yourself to eat, especially if you are having cramping, vomiting, or diarrhea. Don t eat large amounts at a time, even if you are hungry.    If you eat, avoid fatty, greasy, spicy, or fried  foods.    Don t eat dairy foods or drink milk if you have diarrhea. These can make diarrhea worse.  During the first 24 hours you can try:    Oral rehydration solutions.  Sports drinks may be used if you are not too dehydrated and are otherwise healthy.    Soft drinks without caffeine    Ginger ale    Water (plain or flavored)    Decaf tea or coffee    Clear broth, consommé, or bouillon    Gelatin, popsicles, or frozen fruit juice bars  The second 24 hours, if you are feeling better, you can add:    Hot cereal, plain toast, bread, rolls, or crackers    Plain noodles, rice, mashed potatoes, chicken noodle soup, or rice soup    Unsweetened canned fruit (no pineapple)    Bananas  As you recover:    Limit fat intake to less than 15 grams per day. Don t eat margarine, butter, oils, mayonnaise, sauces, gravies, fried foods, peanut butter, meat, poultry, or fish.    Limit fiber. Don t eat raw or cooked vegetables, fresh fruits except bananas, or bran cereals.    Limit caffeine and chocolate.    Limit dairy.    Don t use spices or seasonings except salt.    Go back to your normal diet over time, as you feel better and your symptoms improve.    If the symptoms come back, go back to a simple diet or clear liquids.  Follow-up care  Follow up with your healthcare provider, or as advised. If a stool sample was taken or cultures were done, call the healthcare provider for the results as instructed.  Call 911  Call 911 if you have any of these symptoms:    Trouble breathing    Confusion    Extreme drowsiness or trouble walking    Loss of consciousness    Rapid heart rate    Chest pain    Stiff neck    Seizure  When to seek medical advice  Call your healthcare provider right away if any of these occur:    Abdominal pain that gets worse    Constant lower right abdominal pain    Continued vomiting and inability to keep liquids down    Diarrhea more than 5 times a day    Blood in vomit or stool    Dark urine or no urine for 8 hours,  dry mouth and tongue, tiredness, weakness, or dizziness    Drowsiness    New rash    You don t get better in 2 to 3 days    Fever of 100.4 F (38 C) or higher, or as directed by your healthcare provider  Date Last Reviewed: 6/1/2018 2000-2019 [email or web address]. [address] [place] [address]. All rights reserved. This information is not intended as a substitute for professional medical care. Always follow your healthcare professional's instructions.

## 2020-01-13 NOTE — PROGRESS NOTES
"Subjective     Kylah Carrasquillo is a 84 year old female who presents to clinic today for the following health issues:    HPI   Diarrhea/Cdiff       Duration: x Friday (recurrent)     leaving out of town soon and going [place]/ [place] , for couple of months , so wants to make sure everything is under control     Description:       Consistency of stool: watery, and changing to  loose and formed, No blood        Blood in stool: no        Number of loose stools past 24 hours: 3-4/ day although she thinsk it is not as frequent but bm is more loose . Has  hx of C -diff infection,  so just wants stool checked    Intensity:  mild, moderate    Accompanying signs and symptoms:       Fever: no        Nausea/vomiting: no        Abdominal pain: no may be occasional  \"funny hot feeing\" not cramping may be slight gas feeling on and off last couple f days        Weight loss: no     History (recent antibiotics or travel/ill contacts/med changes/testing done):     Precipitating or alleviating factors: None,  no recent antibiotic or different food , but wants stool checked bc of previous hx of c diff     Therapies tried and outcome: brat diet         Patient Active Problem List   Diagnosis     Mild intermittent asthma     Cardiovascular disease     Raynaud's syndrome     Advanced directives, counseling/discussion     Osteoporosis     Hyperlipidemia LDL goal <100     First degree AV block     Cataracta     Hallux valgus, acquired     Arthritis of right foot     S/P hysterectomy     Overweight (BMI 25.0-29.9)     Past Surgical History:   Procedure Laterality Date     CYSTOCELE REPAIR  1970's     HEMORRHOIDECTOMY  1970's     HERNIA REPAIR, UMBILICAL  1970's     HYSTERECTOMY, VAGINAL  1988    menorrhagia     PHACOEMULSIFICATION CLEAR CORNEA WITH STANDARD INTRAOCULAR LENS IMPLANT  8/7/2014    Procedure: PHACOEMULSIFICATION CLEAR CORNEA WITH STANDARD INTRAOCULAR LENS IMPLANT;  Surgeon: Austin Garcia MD;  Location: [facility]" PHACOEMULSIFICATION CLEAR CORNEA WITH STANDARD INTRAOCULAR LENS IMPLANT Left 2014    Procedure: PHACOEMULSIFICATION CLEAR CORNEA WITH STANDARD INTRAOCULAR LENS IMPLANT;  Surgeon: Austin Garcia MD;  [facility]     TONSILLECTOMY  1945     TUBAL LIGATION         Social History     Tobacco Use     Smoking status: Former Smoker     Last attempt to quit: 11/15/1990     Years since quittin.1     Smokeless tobacco: Never Used   Substance Use Topics     Alcohol use: Yes     Alcohol/week: 0.0 standard drinks     Comment: occassional     Family History   Problem Relation Age of Onset     Asthma Mother      Osteoporosis Mother      C.A.D. Father      Diabetes Daughter      Thyroid Disease Daughter      C.A.D. Brother      C.A.D. Brother      Thyroid Disease Sister      Diabetes Brother      Hypertension No family hx of      Breast Cancer No family hx of      Cancer - colorectal No family hx of      Anesthesia Reaction No family hx of      Blood Disease No family hx of      Eye Disorder No family hx of            Reviewed and updated as needed this visit by Provider         Review of Systems   ROS COMP: Constitutional, HEENT, cardiovascular, pulmonary, GI, , musculoskeletal, neuro, skin, endocrine and psych systems are negative, except as otherwise noted.      Objective    There were no vitals taken for this visit.  There is no height or weight on file to calculate BMI.  Physical Exam   GENERAL: healthy, alert and no distress  EYES: Eyes grossly normal to inspection, and conjunctivae and sclerae normal  HENT: oropharynx clear and oral mucous membranes moist  NECK: no adenopathy,  RESP: lungs clear to auscultation - no rales, rhonchi or wheezes  CV: regular rate and rhythm, normal S1 S2, no S3 or S4,  ABDOMEN: soft, nontender, no hepatosplenomegaly, no masses and bowel sounds normal  MS: no edema            Assessment & Plan     (R19.7) Diarrhea, unspecified type  (primary encounter  diagnosis)  Comment: some what lose , but not to bad   Plan: Clostridium difficile toxin B PCR, Enteric         Bacteria and Virus Panel by JENNIE Stool, Ova and         Parasite Exam Routine              Discussed possible differential diagnosis for her symptoms/ diarrhea.  viral vs may be food poisoning, but she is mostly worried about C diff bc of previous problem .   Clinically she is doing ok. Talked about hydration, fluids, brat diet etc. Talked about warning s/s for which should be seen. Also gave containers for stool, test, as she wish to proceed with stool test. She will f/u if problem       Azucena Almanzar MD  [facility] [place]

## 2020-01-14 DIAGNOSIS — R19.7 DIARRHEA, UNSPECIFIED TYPE: ICD-10-CM

## 2020-01-14 LAB
C DIFF TOX B STL QL: NEGATIVE
SPECIMEN SOURCE: NORMAL

## 2020-01-14 PROCEDURE — 87209 SMEAR COMPLEX STAIN: CPT | Performed by: FAMILY MEDICINE

## 2020-01-14 PROCEDURE — 87493 C DIFF AMPLIFIED PROBE: CPT | Mod: XU | Performed by: FAMILY MEDICINE

## 2020-01-14 PROCEDURE — 87177 OVA AND PARASITES SMEARS: CPT | Performed by: FAMILY MEDICINE

## 2020-01-14 PROCEDURE — 87506 IADNA-DNA/RNA PROBE TQ 6-11: CPT | Performed by: FAMILY MEDICINE

## 2020-01-16 ENCOUNTER — TELEPHONE (OUTPATIENT)
Dept: FAMILY MEDICINE | Facility: CLINIC | Age: 85
End: 2020-01-16

## 2020-01-16 DIAGNOSIS — A04.72 C. DIFFICILE COLITIS: Primary | ICD-10-CM

## 2020-01-16 RX ORDER — METRONIDAZOLE 500 MG/1
500 TABLET ORAL 3 TIMES DAILY
Qty: 21 TABLET | Refills: 0 | Status: SHIPPED | OUTPATIENT
Start: 2020-01-16 | End: 2020-04-06

## 2020-01-17 NOTE — TELEPHONE ENCOUNTER
Please see 1/14/20 result note and advise.     Anna Urena RN   [facility] - Triage

## 2020-01-17 NOTE — TELEPHONE ENCOUNTER
Looks like she had a positive C.diff test on 11/6 but repeat testing on 1/14 was negative. The rest of her stool results were also negative.

## 2020-01-17 NOTE — TELEPHONE ENCOUNTER
Left a detailed message with MD note below on patients voicemail.     Anna Atkinson RN, BSN  [facility] [place]

## 2020-01-17 NOTE — TELEPHONE ENCOUNTER
Patient was left a detailed message already, please see message below with results.     Anna Atkinson RN, BSN  [facility] [place]

## 2020-01-17 NOTE — TELEPHONE ENCOUNTER
Stool test for C. difficile, ova parasites and bacteria are all negative.  Please notify the patient.        Issa Puga MD  [facility], [place]

## 2020-01-17 NOTE — TELEPHONE ENCOUNTER
Pt called to talk with a nurse about her lab results - Is needs to go to [facility] and would like to make sure that she is picking up the correct on.   Call @[phone]

## 2020-01-17 NOTE — TELEPHONE ENCOUNTER
Left message for patient to call back to discuss results .        The one on 11/6 was positive and I think she was treated for that already    the one 01/14 came back negative for c diff . Other stools are also negative. So no need for med's. Hope she is feeling better , then she does not need any more  med's.   Script was faxed earlier since I am out of the office till Monday, although she does not need to take it if her sx have improved.   .

## 2020-01-17 NOTE — TELEPHONE ENCOUNTER
Reason for Call:  Request for results:    Name of test or procedure: Lab    Date of test of procedure: 01/14/19    Location of the test or procedure: [facility] to leave the result message on voice mail or with a family member? YES    Phone number Patient can be reached at:  Cell number on file:    Telephone Information:   Mobile [phone]       Additional comments: anytime     Call taken on 1/16/2020 at 6:00 PM by DEE BRAY

## 2020-04-06 ENCOUNTER — VIRTUAL VISIT (OUTPATIENT)
Dept: FAMILY MEDICINE | Facility: CLINIC | Age: 85
End: 2020-04-06
Payer: COMMERCIAL

## 2020-04-06 ENCOUNTER — NURSE TRIAGE (OUTPATIENT)
Dept: FAMILY MEDICINE | Facility: CLINIC | Age: 85
End: 2020-04-06

## 2020-04-06 DIAGNOSIS — R11.0 NAUSEA: ICD-10-CM

## 2020-04-06 DIAGNOSIS — R19.7 DIARRHEA, UNSPECIFIED TYPE: Primary | ICD-10-CM

## 2020-04-06 LAB
C DIFF TOX B STL QL: NEGATIVE
SPECIMEN SOURCE: NORMAL

## 2020-04-06 PROCEDURE — 87493 C DIFF AMPLIFIED PROBE: CPT | Performed by: FAMILY MEDICINE

## 2020-04-06 PROCEDURE — 87177 OVA AND PARASITES SMEARS: CPT | Performed by: FAMILY MEDICINE

## 2020-04-06 PROCEDURE — 87209 SMEAR COMPLEX STAIN: CPT | Performed by: FAMILY MEDICINE

## 2020-04-06 PROCEDURE — 87506 IADNA-DNA/RNA PROBE TQ 6-11: CPT | Performed by: FAMILY MEDICINE

## 2020-04-06 PROCEDURE — 99213 OFFICE O/P EST LOW 20 MIN: CPT | Mod: TEL | Performed by: FAMILY MEDICINE

## 2020-04-06 NOTE — TELEPHONE ENCOUNTER
"Patient states that she has 3-4 loose stools per day since returning from [place] 3 weeks ago. States consistency of mashed potatoes. States at first were yellow, now getting more brown. No abdominal pain. Yesterday for the first time felt nauseated for brief periods of time.   BRAT diet since last Wednesday. Drinking all day and no signs of dehydration.  Willing to do a telephone visit.   Son may be able to  and drop off stool sample containers.       Additional Information    Negative: Shock suspected (e.g., cold/pale/clammy skin, too weak to stand, low BP, rapid pulse)    Negative: Difficult to awaken or acting confused (e.g., disoriented, slurred speech)    Negative: Sounds like a life-threatening emergency to the triager    Negative: Vomiting also present and worse than the diarrhea    Negative: Blood in stool and without diarrhea    Negative: SEVERE abdominal pain (e.g., excruciating) and present > 1 hour    Negative: SEVERE abdominal pain and age > 60    Negative: Bloody, black, or tarry bowel movements    Negative: SEVERE diarrhea (e.g., 7 or more times / day more than normal) and age > 60 years    Negative: Constant abdominal pain lasting > 2 hours    Negative: Drinking very little and has signs of dehydration (e.g., no urine > 12 hours, very dry mouth, very lightheaded)    Negative: Patient sounds very sick or weak to the triager    MODERATE diarrhea (e.g., 4-6 times / day more than normal) and present > 48 hours (2 days)    Answer Assessment - Initial Assessment Questions  1. DIARRHEA SEVERITY: \"How bad is the diarrhea?\" \"How many extra stools have you had in the past 24 hours than normal?\"     - MILD: Few loose or mushy BMs; increase of 1-3 stools over normal daily number of stools; mild increase in ostomy output.    - MODERATE: Increase of 4-6 stools daily over normal; moderate increase in ostomy output.    - SEVERE (or Worst Possible): Increase of 7 or more stools daily over normal; moderate " "increase in ostomy output; incontinence.      Twice so far this morning. Usually only in the morning until noon. About 4 times yesterday, 3 day before and 4 the day before that only in the morning.   Moderate  2. ONSET: \"When did the diarrhea begin?\"       Loose stools started about 3 weeks, right before leaving [place]. Patient was there for 6 weeks.  3. BM CONSISTENCY: \"How loose or watery is the diarrhea?\"       Were really watery. Since brat diet really, really loose, but not water. At first dukes yellow, this morning a little darker. \"loose mashed potatoes.\"  4. VOMITING: \"Are you also vomiting?\" If so, ask: \"How many times in the past 24 hours?\"       No vomiting. Felt very nauseated yesterday.  5. ABDOMINAL PAIN: \"Are you having any abdominal pain?\" If yes: \"What does it feel like?\" (e.g., crampy, dull, intermittent, constant)       No pain at all. Not since started.   6. ABDOMINAL PAIN SEVERITY: If present, ask: \"How bad is the pain?\"  (e.g., Scale 1-10; mild, moderate, or severe)     - MILD (1-3): doesn't interfere with normal activities, abdomen soft and not tender to touch      - MODERATE (4-7): interferes with normal activities or awakens from sleep, tender to touch      - SEVERE (8-10): excruciating pain, doubled over, unable to do any normal activities        none  7. ORAL INTAKE: If vomiting, \"Have you been able to drink liquids?\" \"How much fluids have you had in the past 24 hours?\"      Drinking all day long. Sports drink, tea, water. On BRAT diet for last Wednesday or Thursday. Before that avoiding greasy, spicy foods.  8. HYDRATION: \"Any signs of dehydration?\" (e.g., dry mouth [not just dry lips], too weak to stand, dizziness, new weight loss) \"When did you last urinate?\"      No, no problems urinating. Urine is clear  9. EXPOSURE: \"Have you traveled to a foreign country recently?\" \"Have you been exposed to anyone with diarrhea?\" \"Could you have eaten any food that was spoiled?\"      [place]  10. " "ANTIBIOTIC USE: \"Are you taking antibiotics now or have you taken antibiotics in the past 2 months?\"        None in the last 2 months  11. OTHER SYMPTOMS: \"Do you have any other symptoms?\" (e.g., fever, blood in stool)        Temperature 98.2 F this morning  12. PREGNANCY: \"Is there any chance you are pregnant?\" \"When was your last menstrual period?\"        NA    Protocols used: DIARRHEA-A-OH  Vvii Ordaz RN    "

## 2020-04-06 NOTE — PROGRESS NOTES
"Subjective     Kylah Carrasquillo is a 84 year old female who is being evaluated via a billable telephone visit.      The patient has been notified of following:     \"This telephone visit will be conducted via a call between you and your physician/provider. We have found that certain health care needs can be provided without the need for a physical exam.  This service lets us provide the care you need with a short phone conversation.  If a prescription is necessary we can send it directly to your pharmacy.  If lab work is needed we can place an order for that and you can then stop by our lab to have the test done at a later time.    If during the course of the call the physician/provider feels a telephone visit is not appropriate, you will not be charged for this service.\"     Patient has given verbal consent for Telephone visit?  Yes    Kylah Carrasquillo complains of No chief complaint on file.      ALLERGIES  Benadryl [diphenhydramine hcl]; Morphine; and Penicillins     Diarrhea  Onset: over 3 weeks may be slight  Sx right before leaving, form [place] but  Got worse after trip . Although she has been back for almost 3 weeks and her sx have improved but felt worse yesterday and also had nausea so she got concerned. She thinsk nausea is better today and she is feeling better today but just concerned bc pf previous hx of c-diff, so wants stool test.     Description:   Consistency of stool: watery, runny and loose  Blood in stool: no   Number of loose stools in past 24 hours: 3- morning    Progression of Symptoms:  worsening    Accompanying Signs & Symptoms:  Fever: no   Nausea or vomiting; 3-4 times Nausea on and off,   Bm loose runny mash potatoes , no pain or cramping with bm   Abdominal pain: no   Episodes of constipation: no   Weight loss: No    History:   Ill contacts: no   Recent use of antibiotics: no    Recent travels: YES- [place] 3 week ago and [place] before that but somewhat worse since , not too bad but " concerned bc of hx of c diff        Recent medication-new or changes(Rx or OTC): no     Therapies Tried and outcome:  BRAT diet,Loperamide- didn't help           Patient Active Problem List   Diagnosis     Mild intermittent asthma     Cardiovascular disease     Raynaud's syndrome     Advanced directives, counseling/discussion     Osteoporosis     Hyperlipidemia LDL goal <100     First degree AV block     Cataracta     Hallux valgus, acquired     Arthritis of right foot     S/P hysterectomy     Overweight (BMI 25.0-29.9)     Past Surgical History:   Procedure Laterality Date     CYSTOCELE REPAIR       HEMORRHOIDECTOMY       HERNIA REPAIR, UMBILICAL       HYSTERECTOMY, VAGINAL      menorrhagia     PHACOEMULSIFICATION CLEAR CORNEA WITH STANDARD INTRAOCULAR LENS IMPLANT  2014    Procedure: PHACOEMULSIFICATION CLEAR CORNEA WITH STANDARD INTRAOCULAR LENS IMPLANT;  Surgeon: Austin Garcia MD;  Location: [facility]     PHACOEMULSIFICATION CLEAR CORNEA WITH STANDARD INTRAOCULAR LENS IMPLANT Left 2014    Procedure: PHACOEMULSIFICATION CLEAR CORNEA WITH STANDARD INTRAOCULAR LENS IMPLANT;  Surgeon: Austin Garcia MD;  Location:  [facility]     TONSILLECTOMY       TUBAL LIGATION         Social History     Tobacco Use     Smoking status: Former Smoker     Last attempt to quit: 11/15/1990     Years since quittin.4     Smokeless tobacco: Never Used   Substance Use Topics     Alcohol use: Yes     Alcohol/week: 0.0 standard drinks     Comment: occassional     Family History   Problem Relation Age of Onset     Asthma Mother      Osteoporosis Mother      C.A.D. Father      Diabetes Daughter      Thyroid Disease Daughter      C.A.D. Brother      C.A.D. Brother      Thyroid Disease Sister      Diabetes Brother      Hypertension No family hx of      Breast Cancer No family hx of      Cancer - colorectal No family hx of      Anesthesia Reaction No family hx of      Blood Disease No family  hx of      Eye Disorder No family hx of            Reviewed and updated as needed this visit by Provider         Review of Systems   ROS COMP: Constitutional, HEENT, cardiovascular, pulmonary, GI, , musculoskeletal, neuro, skin, endocrine and psych systems are negative, except as otherwise noted.         Assessment/Plan:    (R19.7) Diarrhea, unspecified type  (primary encounter diagnosis)  Comment: stable and not too bad now but concerned bc of previous hx of recurrent C- diff , so wish to proceed with stool test   Plan: Clostridium difficile toxin B PCR, Enteric         Bacteria and Virus Panel by JENNIE Stool, Ova and         Parasite Exam Routine            (R11.0) Nausea  Comment: x 1 day. resolved   Plan:     Discussed possible differential diagnosis for her symptoms/ diarrhea. Likely viral vs may be food poisoning. Clinically she is doing ok and feeling better today.   Talked about hydration, fluids, brat diet etc. Talked about warning S/S for which should be seen.   although she is stable and not too bad now but concerned bc of previous hx of recurrent C- diff , so she wish to proceed with stool test   So  placed order for test , and her son will  the  containers for stool, test.   if her sx do not completely resolve or any ongoing problem,  She will f/u if problem.  Talked about warning s/s for which should be seen urgently      Cares and  treatment discussed.  follow up if problem   Patient expressed understanding and agreement with treatment plan.   All patient's questions were answered, will let me know if has more later.      Phone call duration:  12 minutes    Azucena Almanzar MD

## 2020-04-06 NOTE — LETTER
April 8, 2020      Kylah Carrasquillo  [address] [facility] [id number]        Dear ,    We are writing to inform you of your test results.    C-diff is negative. Stool test also negative for parasitic infection     Resulted Orders   Ova and Parasite Exam Routine   Result Value Ref Range    Specimen Description Feces     Ova and Parasite Exam Routine parasitology exam negative     Ova and Parasite Exam       Cryptosporidium, Cyclospora, and Microsporidia are not readily detected by this method. A   single negative specimen does not rule out parasitic infection.     Enteric Bacteria and Virus Panel by JENNIE Stool   Result Value Ref Range    Campylobacter group by JENNIE Not Detected NDET^Not Detected    Salmonella species by JENNIE Not Detected NDET^Not Detected    Shigella species by JENNIE Not Detected NDET^Not Detected    Vibrio group by JENNIE Not Detected NDET^Not Detected    Rotavirus A by JENNIE Not Detected NDET^Not Detected    Shiga toxin 1 gene by JENNIE Not Detected NDET^Not Detected    Shiga toxin 2 gene by JENNIE Not Detected NDET^Not Detected    Norovirus I and II by EJNNIE Not Detected NDET^Not Detected    Yersinia enterocolitica by JENNIE Not Detected NDET^Not Detected    Enteric pathogen comment       Testing performed by multiplexed, qualitative PCR using the [employer] Ver[employer] Enteric   Pathogens Nucleic Acid Test. Results should not be used as the sole basis for diagnosis,   treatment, or other patient management decisions.        Comment:      Positive results do not rule out co-infection with other organisms that are   not detected by this test, and may not be the sole or definitive cause of   patient illness.   Negative results in the setting of clinical illness compatible with   gastroenteritis may be due to infection by pathogens that are not detected by   this test or non-infectious causes such as ulcerative colitis, irritable bowel   syndrome, or Crohn's disease.   Note: Shiga toxin producing  E. coli (STEC) typically harbor one or both genes   that encode for Shiga toxins 1 and 2.     Clostridium difficile toxin B PCR   Result Value Ref Range    Specimen Description Feces     C Diff Toxin B PCR Negative NEG^Negative      Comment:      Negative: C. difficile target DNA sequences NOT detected, presumed negative   for C.difficile toxin B or the number of bacteria present may be below the   limit of detection for the test.  FDA approved assay performed using [employer] GeneXpert real-time PCR.  A negative result does not exclude actual disease due to Clostridium difficile   and may be due to improper collection, handling and storage of the specimen   or the number of organisms in the specimen is below the detection limit of the   assay.         If you have any questions or concerns, please call the clinic at the number listed above.       Sincerely,        Azucena Almanzar MD

## 2020-04-06 NOTE — TELEPHONE ENCOUNTER
Reason for Call:   call back    Detailed comments:   Pt  has had C Diff it twice and think that she may have it again.       Phone Number Patient can be reached at: Cell number on file:    Telephone Information:   Mobile [phone]       Best Time: any    Can we leave a detailed message on this number?  Yes      Call taken on 4/6/2020 at 8:31 AM by Lottie Diamond

## 2020-04-07 LAB
C COLI+JEJUNI+LARI FUSA STL QL NAA+PROBE: NOT DETECTED
EC STX1 GENE STL QL NAA+PROBE: NOT DETECTED
EC STX2 GENE STL QL NAA+PROBE: NOT DETECTED
ENTERIC PATHOGEN COMMENT: NORMAL
NOROV GI+II ORF1-ORF2 JNC STL QL NAA+PR: NOT DETECTED
O+P STL MICRO: NORMAL
O+P STL MICRO: NORMAL
RVA NSP5 STL QL NAA+PROBE: NOT DETECTED
SALMONELLA SP RPOD STL QL NAA+PROBE: NOT DETECTED
SHIGELLA SP+EIEC IPAH STL QL NAA+PROBE: NOT DETECTED
SPECIMEN SOURCE: NORMAL
V CHOL+PARA RFBL+TRKH+TNAA STL QL NAA+PR: NOT DETECTED
Y ENTERO RECN STL QL NAA+PROBE: NOT DETECTED

## 2020-04-17 ENCOUNTER — APPOINTMENT (OUTPATIENT)
Dept: GENERAL RADIOLOGY | Facility: CLINIC | Age: 85
DRG: 101 | End: 2020-04-17
Attending: EMERGENCY MEDICINE
Payer: COMMERCIAL

## 2020-04-17 ENCOUNTER — HOSPITAL ENCOUNTER (INPATIENT)
Facility: CLINIC | Age: 85
LOS: 1 days | Discharge: HOME | DRG: 101 | End: 2020-04-20
Attending: EMERGENCY MEDICINE | Admitting: INTERNAL MEDICINE
Payer: COMMERCIAL

## 2020-04-17 ENCOUNTER — APPOINTMENT (OUTPATIENT)
Dept: MRI IMAGING | Facility: CLINIC | Age: 85
DRG: 101 | End: 2020-04-17
Attending: EMERGENCY MEDICINE
Payer: COMMERCIAL

## 2020-04-17 DIAGNOSIS — R41.3 MEMORY LOSS: ICD-10-CM

## 2020-04-17 DIAGNOSIS — G40.909 SEIZURE DISORDER (H): Primary | ICD-10-CM

## 2020-04-17 DIAGNOSIS — R56.9 SEIZURE (H): ICD-10-CM

## 2020-04-17 DIAGNOSIS — R41.82 ALTERED MENTAL STATUS, UNSPECIFIED ALTERED MENTAL STATUS TYPE: ICD-10-CM

## 2020-04-17 PROBLEM — R41.0 CONFUSION: Status: ACTIVE | Noted: 2020-04-17

## 2020-04-17 LAB
ALBUMIN SERPL-MCNC: 3.7 G/DL (ref 3.4–5)
ALBUMIN UR-MCNC: NEGATIVE MG/DL
ALP SERPL-CCNC: 55 U/L (ref 40–150)
ALT SERPL W P-5'-P-CCNC: 18 U/L (ref 0–50)
ANION GAP SERPL CALCULATED.3IONS-SCNC: 6 MMOL/L (ref 3–14)
APPEARANCE UR: CLEAR
AST SERPL W P-5'-P-CCNC: 9 U/L (ref 0–45)
BACTERIA #/AREA URNS HPF: ABNORMAL /HPF
BASOPHILS # BLD AUTO: 0 10E9/L (ref 0–0.2)
BASOPHILS NFR BLD AUTO: 0.5 %
BILIRUB SERPL-MCNC: 0.3 MG/DL (ref 0.2–1.3)
BILIRUB UR QL STRIP: NEGATIVE
BUN SERPL-MCNC: 13 MG/DL (ref 7–30)
CALCIUM SERPL-MCNC: 9.4 MG/DL (ref 8.5–10.1)
CHLORIDE SERPL-SCNC: 104 MMOL/L (ref 94–109)
CO2 SERPL-SCNC: 27 MMOL/L (ref 20–32)
COLOR UR AUTO: ABNORMAL
CREAT SERPL-MCNC: 0.65 MG/DL (ref 0.52–1.04)
CRP SERPL-MCNC: <2.9 MG/L (ref 0–8)
DIFFERENTIAL METHOD BLD: NORMAL
EOSINOPHIL # BLD AUTO: 0.1 10E9/L (ref 0–0.7)
EOSINOPHIL NFR BLD AUTO: 1.3 %
ERYTHROCYTE [DISTWIDTH] IN BLOOD BY AUTOMATED COUNT: 13.6 % (ref 10–15)
GFR SERPL CREATININE-BSD FRML MDRD: 81 ML/MIN/{1.73_M2}
GLUCOSE SERPL-MCNC: 96 MG/DL (ref 70–99)
GLUCOSE UR STRIP-MCNC: NEGATIVE MG/DL
HCT VFR BLD AUTO: 44.3 % (ref 35–47)
HGB BLD-MCNC: 14.4 G/DL (ref 11.7–15.7)
HGB UR QL STRIP: NEGATIVE
IMM GRANULOCYTES # BLD: 0.1 10E9/L (ref 0–0.4)
IMM GRANULOCYTES NFR BLD: 0.6 %
KETONES UR STRIP-MCNC: NEGATIVE MG/DL
LACTATE BLD-SCNC: 1.2 MMOL/L (ref 0.7–2)
LEUKOCYTE ESTERASE UR QL STRIP: ABNORMAL
LYMPHOCYTES # BLD AUTO: 2.8 10E9/L (ref 0.8–5.3)
LYMPHOCYTES NFR BLD AUTO: 31.8 %
MCH RBC QN AUTO: 31.8 PG (ref 26.5–33)
MCHC RBC AUTO-ENTMCNC: 32.5 G/DL (ref 31.5–36.5)
MCV RBC AUTO: 98 FL (ref 78–100)
MONOCYTES # BLD AUTO: 0.7 10E9/L (ref 0–1.3)
MONOCYTES NFR BLD AUTO: 8.4 %
NEUTROPHILS # BLD AUTO: 5 10E9/L (ref 1.6–8.3)
NEUTROPHILS NFR BLD AUTO: 57.4 %
NITRATE UR QL: NEGATIVE
NRBC # BLD AUTO: 0 10*3/UL
NRBC BLD AUTO-RTO: 0 /100
PH UR STRIP: 7 PH (ref 5–7)
PLATELET # BLD AUTO: 337 10E9/L (ref 150–450)
POTASSIUM SERPL-SCNC: 3.9 MMOL/L (ref 3.4–5.3)
PROCALCITONIN SERPL-MCNC: <0.05 NG/ML
PROT SERPL-MCNC: 7.4 G/DL (ref 6.8–8.8)
RBC # BLD AUTO: 4.53 10E12/L (ref 3.8–5.2)
RBC #/AREA URNS AUTO: 0 /HPF (ref 0–2)
SODIUM SERPL-SCNC: 137 MMOL/L (ref 133–144)
SOURCE: ABNORMAL
SP GR UR STRIP: 1 (ref 1–1.03)
SQUAMOUS #/AREA URNS AUTO: 1 /HPF (ref 0–1)
TROPONIN I SERPL-MCNC: <0.015 UG/L (ref 0–0.04)
UROBILINOGEN UR STRIP-MCNC: NORMAL MG/DL (ref 0–2)
WBC # BLD AUTO: 8.7 10E9/L (ref 4–11)
WBC #/AREA URNS AUTO: 4 /HPF (ref 0–5)

## 2020-04-17 PROCEDURE — 80053 COMPREHEN METABOLIC PANEL: CPT | Performed by: EMERGENCY MEDICINE

## 2020-04-17 PROCEDURE — 25000128 H RX IP 250 OP 636: Performed by: EMERGENCY MEDICINE

## 2020-04-17 PROCEDURE — 87040 BLOOD CULTURE FOR BACTERIA: CPT | Performed by: EMERGENCY MEDICINE

## 2020-04-17 PROCEDURE — 87186 SC STD MICRODIL/AGAR DIL: CPT | Performed by: EMERGENCY MEDICINE

## 2020-04-17 PROCEDURE — 25800030 ZZH RX IP 258 OP 636: Performed by: EMERGENCY MEDICINE

## 2020-04-17 PROCEDURE — 86140 C-REACTIVE PROTEIN: CPT | Performed by: EMERGENCY MEDICINE

## 2020-04-17 PROCEDURE — 70551 MRI BRAIN STEM W/O DYE: CPT

## 2020-04-17 PROCEDURE — 84484 ASSAY OF TROPONIN QUANT: CPT | Performed by: EMERGENCY MEDICINE

## 2020-04-17 PROCEDURE — 81001 URINALYSIS AUTO W/SCOPE: CPT | Performed by: EMERGENCY MEDICINE

## 2020-04-17 PROCEDURE — 87088 URINE BACTERIA CULTURE: CPT | Performed by: EMERGENCY MEDICINE

## 2020-04-17 PROCEDURE — G0378 HOSPITAL OBSERVATION PER HR: HCPCS

## 2020-04-17 PROCEDURE — 84145 PROCALCITONIN (PCT): CPT | Performed by: EMERGENCY MEDICINE

## 2020-04-17 PROCEDURE — 93005 ELECTROCARDIOGRAM TRACING: CPT

## 2020-04-17 PROCEDURE — 99219 ZZC INITIAL OBSERVATION CARE,LEVL II: CPT | Performed by: INTERNAL MEDICINE

## 2020-04-17 PROCEDURE — 99285 EMERGENCY DEPT VISIT HI MDM: CPT | Mod: 25

## 2020-04-17 PROCEDURE — 87086 URINE CULTURE/COLONY COUNT: CPT | Performed by: EMERGENCY MEDICINE

## 2020-04-17 PROCEDURE — 71045 X-RAY EXAM CHEST 1 VIEW: CPT

## 2020-04-17 PROCEDURE — 96366 THER/PROPH/DIAG IV INF ADDON: CPT

## 2020-04-17 PROCEDURE — 96374 THER/PROPH/DIAG INJ IV PUSH: CPT

## 2020-04-17 PROCEDURE — 85025 COMPLETE CBC W/AUTO DIFF WBC: CPT | Performed by: EMERGENCY MEDICINE

## 2020-04-17 PROCEDURE — 83605 ASSAY OF LACTIC ACID: CPT | Performed by: EMERGENCY MEDICINE

## 2020-04-17 PROCEDURE — 87635 SARS-COV-2 COVID-19 AMP PRB: CPT | Performed by: EMERGENCY MEDICINE

## 2020-04-17 RX ORDER — AMLODIPINE BESYLATE 2.5 MG/1
2.5 TABLET ORAL DAILY
Status: DISCONTINUED | OUTPATIENT
Start: 2020-04-18 | End: 2020-04-20 | Stop reason: HOSPADM

## 2020-04-17 RX ORDER — ACETAMINOPHEN 325 MG/1
650 TABLET ORAL EVERY 4 HOURS PRN
Status: DISCONTINUED | OUTPATIENT
Start: 2020-04-17 | End: 2020-04-20 | Stop reason: HOSPADM

## 2020-04-17 RX ORDER — NALOXONE HYDROCHLORIDE 0.4 MG/ML
.1-.4 INJECTION, SOLUTION INTRAMUSCULAR; INTRAVENOUS; SUBCUTANEOUS
Status: DISCONTINUED | OUTPATIENT
Start: 2020-04-17 | End: 2020-04-20 | Stop reason: HOSPADM

## 2020-04-17 RX ORDER — FLUTICASONE PROPIONATE 110 UG/1
2 AEROSOL, METERED RESPIRATORY (INHALATION) 2 TIMES DAILY
Status: DISCONTINUED | OUTPATIENT
Start: 2020-04-18 | End: 2020-04-20 | Stop reason: HOSPADM

## 2020-04-17 RX ORDER — OMEGA-3-ACID ETHYL ESTERS 1 G/1
1 CAPSULE, LIQUID FILLED ORAL DAILY
COMMUNITY

## 2020-04-17 RX ORDER — MULTIPLE VITAMINS W/ MINERALS TAB 9MG-400MCG
1 TAB ORAL DAILY
COMMUNITY

## 2020-04-17 RX ORDER — CEFTRIAXONE 1 G/1
1 INJECTION, POWDER, FOR SOLUTION INTRAMUSCULAR; INTRAVENOUS ONCE
Status: COMPLETED | OUTPATIENT
Start: 2020-04-17 | End: 2020-04-18

## 2020-04-17 RX ORDER — ONDANSETRON 4 MG/1
4 TABLET, ORALLY DISINTEGRATING ORAL EVERY 6 HOURS PRN
Status: DISCONTINUED | OUTPATIENT
Start: 2020-04-17 | End: 2020-04-20 | Stop reason: HOSPADM

## 2020-04-17 RX ORDER — ONDANSETRON 2 MG/ML
4 INJECTION INTRAMUSCULAR; INTRAVENOUS EVERY 6 HOURS PRN
Status: DISCONTINUED | OUTPATIENT
Start: 2020-04-17 | End: 2020-04-20 | Stop reason: HOSPADM

## 2020-04-17 RX ORDER — MULTIVIT-MIN/IRON/FOLIC ACID/K 18-600-40
1000 CAPSULE ORAL DAILY
COMMUNITY

## 2020-04-17 RX ORDER — ACETAMINOPHEN 650 MG/1
650 SUPPOSITORY RECTAL EVERY 4 HOURS PRN
Status: DISCONTINUED | OUTPATIENT
Start: 2020-04-17 | End: 2020-04-20 | Stop reason: HOSPADM

## 2020-04-17 RX ADMIN — CEFTRIAXONE 1 G: 1 INJECTION, POWDER, FOR SOLUTION INTRAMUSCULAR; INTRAVENOUS at 16:37

## 2020-04-17 RX ADMIN — SODIUM CHLORIDE 1000 ML: 9 INJECTION, SOLUTION INTRAVENOUS at 14:46

## 2020-04-17 ASSESSMENT — MIFFLIN-ST. JEOR
SCORE: 947.57
SCORE: 987.03

## 2020-04-17 NOTE — ED PROVIDER NOTES
"History     Chief Complaint:  Altered Mental Status     HPI  Kylah Carrasquillo is a 84 year old year old female who presents for evaluation of altered mental status with memory loss since Sunday.  History is per the patient and her son (over the phone).  The patient was slumped over today and she seemed even more confused per her son.  She is normally very sharp and intelligent, so this is very unusual for her. She has had diarrhea for 3 weeks and was recently tested negative for C-Diff  She had C-Diff and a \"parasite infection\" last August after a trip to [facility].  Diarrhea is now resolved.  She denies vomiting, blood in stools, fever/chills, cough, chest pain, SOB, runny nose    Allergies:   Morphine  Benadryl   Penicillins    Medications:   Albuterol   Norvasc  Zyrtec   Flovent   Spiriva    Medical History:   Benign hypertension   Cataracta  Gastroesophageal reflux disease  First degree AV block  Mild intermittent asthma   Osteporosis   PONV  Raynaud's syndrome   Cardiovascular disease, unspecified   Hallux valgus  Arthritis of foot    Surgical History   Cystocele repair  Hemorrhoidectomy   Hernia repair   Hysterectomy   Phacoemulsification clear cornea 2x  Tonsillectomy   Tubal ligation     Family History:   Mother: Asthma, Osteocytosis   Father: CAD  Daughter: Diabetes, Thyroid disease    Social History:  Patient was not accompanied to the ED.   Smoking Status: Former Smoker    Type: Cigarettes    Quit 1990  Smokeless Tobacco: Never Used   Alcohol Use: Positive    Drug Use: Negative   Primary Care: Azucena Almanzar     Review of Systems   Unable to perform ROS: Mental status change       Physical Exam     Patient Vitals for the past 24 hrs:   BP Temp Temp src Heart Rate Resp SpO2 Height Weight   04/17/20 1330 (!) 145/72 97.7  F (36.5  C) Oral 74 16 98 % 1.575 m (5' 2\") 54.4 kg (120 lb)          Physical Exam  Nursing note and vitals reviewed.  Constitutional:  Appears well-developed and well-nourished. "   HENT:   Head:    Atraumatic.   Mouth/Throat:   Oropharynx is clear and moist. No oropharyngeal exudate.   Eyes:    Pupils are equal, round, and reactive to light.   Neck:    Normal range of motion. Neck supple.      No tracheal deviation present. No thyromegaly present.   Cardiovascular:  Normal rate, regular rhythm, no murmur   Pulmonary/Chest: Breath sounds are clear and equal without wheezes or crackles.  Abdominal:   Soft. Bowel sounds are normal. Exhibits no distension and      no mass. There is no tenderness.      There is no rebound and no guarding.   Musculoskeletal:  Exhibits no edema.   Lymphadenopathy:  No cervical adenopathy.   Neurological:   Alert and oriented to person, place. GCS 15.  CN 2-12 intact.  and proximal upper extremity strength strong and equal.  Bilateral lower extremity strength strong and equal, including strong dorsiflexion and plantarflexion strength.  Sensation intact and equal to the face, arms and legs.  No facial droop or weakness. Normal speech.  Follows commands and answers questions normally.    Skin:    Skin is warm and dry. No rash noted. No pallor.       Emergency Department Course     ECG:  ECG taken at 1909, ECG read at 1916  Sinus rhythm with 1st degree AV block   Left anterior fascicular block   Abnormal ECG  Rate 64 bpm. UT interval 226 ms. QRS duration 86 ms. QT/QTc 391/402 ms. P-R-T axes 45 -49 33.     Imaging:  Radiology results were communicated with the patient who voiced understanding of the findings.    MR Brain w/o Contrast  Diffuse cerebral volume loss and cerebral white matter  changes consistent with chronic small vessel ischemic disease. No  evidence for acute intracranial pathology.    XR Chest Port 1 View   No acute disease.    SHAYY LOWERY MD    Laboratory:  Laboratory findings were communicated with the patient who voiced understanding of the findings.    UA with Microscopic: Specific Gravity 1.002 (L)  Bacteria many  (A) Leukocyte Esterase small  (A)     Covid-19: Pending     Lactic Acid: 1.2  Procalcitonin: <0.05  Troponin: (Collected 24208) <0.015   CRP inflammation    CBC: WBC 8.7, HGB 14.4,   CMP:  (Creatinine 0.65) o/w WNL     Interventions:   1446 NS 1000 mL IV   1637 Rocephin 1 g IV     Emergency Department Course:    1359 Nursing notes and vitals reviewed.   I performed an exam of the patient as documented above.     1436 IV was inserted and blood was drawn for laboratory testing, results above.    1450 The patient was sent for XR while in the emergency department, results above.     1555 A nares sample was obtained for laboratory testing as documented above.     1823 The patient was sent for a MR while in the emergency department, results above.      1909 EKG obtained as noted above.     1928 I consulted with Dr. Kim of the hospitalist services. They are in agreement to accept the patient for admission.Findings and plan explained to the Patient who consents to admission. Discussed the patient with Dr. Kim, who will admit the patient to an observation bed for further monitoring, evaluation, and treatment.    Impression & Plan     Covid-19  Kylah Carrasquillo was evaluated during a global COVID-19 pandemic, which necessitated consideration that the patient might be at risk for infection with the SARS-CoV-2 virus that causes COVID-19.   Applicable protocols for evaluation were followed during the patient's care.     Medical Decision Making:  This patient has altered mental status with sudden memory loss 5 days ago, which is of unclear etiology.  I was initially concerned about the possibility of stroke or intracranial bleeding so MRI of her brain was performed and was unremarkable.  I considered the possibility of infection since she has some signs of UTI on her urinalysis with many bacteria so urine culture was sent and she was given ceftriaxone IV.  She was IV fluid hydrated since dehydration can cause mental status changes also.  She does  not have any sign of encephalitis or meningitis clinically.  Since patients with COVID-19 can get encephalopathy and diarrhea as symptoms, COVID-19 testing was sent.  She was admitted to the care of Dr. Kim the hospitalist for observation and further work-up and evaluation of her altered mental status.    Di I amagnosis:     ICD-10-CM    1. Altered mental status, unspecified altered mental status type  R41.82    2. Memory loss  R41.3         Disposition:  Admitted to Dr. Kim.    Scribe Disclosure:  I, Jerman Gibbs, am serving as a scribe at 2:01 PM on 4/17/2020 to document services personally performed by Mayi Gray MD based on my observations and the provider's statements to me.          Mayi Gray MD  04/17/20 5753

## 2020-04-18 LAB
AMMONIA PLAS-SCNC: 14 UMOL/L (ref 10–50)
FOLATE SERPL-MCNC: 30.8 NG/ML
INTERPRETATION ECG - MUSE: NORMAL
SARS-COV-2 PCR COMMENT: NORMAL
SARS-COV-2 RNA SPEC QL NAA+PROBE: NEGATIVE
SARS-COV-2 RNA SPEC QL NAA+PROBE: NORMAL
SPECIMEN SOURCE: NORMAL
SPECIMEN SOURCE: NORMAL
TSH SERPL DL<=0.005 MIU/L-ACNC: 2.03 MU/L (ref 0.4–4)
VIT B12 SERPL-MCNC: 650 PG/ML (ref 193–986)

## 2020-04-18 PROCEDURE — 25000128 H RX IP 250 OP 636: Performed by: PSYCHIATRY & NEUROLOGY

## 2020-04-18 PROCEDURE — 99207 ZZC CDG-CODE CATEGORY CHANGED: CPT | Performed by: INTERNAL MEDICINE

## 2020-04-18 PROCEDURE — 82607 VITAMIN B-12: CPT | Performed by: PSYCHIATRY & NEUROLOGY

## 2020-04-18 PROCEDURE — 99232 SBSQ HOSP IP/OBS MODERATE 35: CPT | Performed by: INTERNAL MEDICINE

## 2020-04-18 PROCEDURE — G0378 HOSPITAL OBSERVATION PER HR: HCPCS

## 2020-04-18 PROCEDURE — 82140 ASSAY OF AMMONIA: CPT | Performed by: PSYCHIATRY & NEUROLOGY

## 2020-04-18 PROCEDURE — 25000132 ZZH RX MED GY IP 250 OP 250 PS 637: Performed by: PSYCHIATRY & NEUROLOGY

## 2020-04-18 PROCEDURE — 96366 THER/PROPH/DIAG IV INF ADDON: CPT

## 2020-04-18 PROCEDURE — 25000132 ZZH RX MED GY IP 250 OP 250 PS 637: Performed by: INTERNAL MEDICINE

## 2020-04-18 PROCEDURE — 82746 ASSAY OF FOLIC ACID SERUM: CPT | Performed by: PSYCHIATRY & NEUROLOGY

## 2020-04-18 PROCEDURE — 96367 TX/PROPH/DG ADDL SEQ IV INF: CPT

## 2020-04-18 PROCEDURE — 25000128 H RX IP 250 OP 636: Performed by: INTERNAL MEDICINE

## 2020-04-18 PROCEDURE — 96376 TX/PRO/DX INJ SAME DRUG ADON: CPT

## 2020-04-18 PROCEDURE — 36415 COLL VENOUS BLD VENIPUNCTURE: CPT | Performed by: PSYCHIATRY & NEUROLOGY

## 2020-04-18 PROCEDURE — 93005 ELECTROCARDIOGRAM TRACING: CPT

## 2020-04-18 PROCEDURE — 25800030 ZZH RX IP 258 OP 636: Performed by: PSYCHIATRY & NEUROLOGY

## 2020-04-18 PROCEDURE — 84443 ASSAY THYROID STIM HORMONE: CPT | Performed by: PSYCHIATRY & NEUROLOGY

## 2020-04-18 RX ORDER — LEVETIRACETAM 500 MG/1
500 TABLET ORAL 2 TIMES DAILY
Status: DISCONTINUED | OUTPATIENT
Start: 2020-04-18 | End: 2020-04-20 | Stop reason: HOSPADM

## 2020-04-18 RX ORDER — CLONIDINE HYDROCHLORIDE 0.1 MG/1
0.1 TABLET ORAL EVERY 6 HOURS PRN
Status: DISCONTINUED | OUTPATIENT
Start: 2020-04-18 | End: 2020-04-20 | Stop reason: HOSPADM

## 2020-04-18 RX ORDER — CEFTRIAXONE 1 G/1
1 INJECTION, POWDER, FOR SOLUTION INTRAMUSCULAR; INTRAVENOUS EVERY 24 HOURS
Status: DISCONTINUED | OUTPATIENT
Start: 2020-04-18 | End: 2020-04-20 | Stop reason: HOSPADM

## 2020-04-18 RX ADMIN — LEVETIRACETAM 500 MG: 500 TABLET, FILM COATED ORAL at 19:47

## 2020-04-18 RX ADMIN — ACETAMINOPHEN 650 MG: 325 TABLET, FILM COATED ORAL at 15:53

## 2020-04-18 RX ADMIN — AMLODIPINE BESYLATE 2.5 MG: 2.5 TABLET ORAL at 11:09

## 2020-04-18 RX ADMIN — THIAMINE HYDROCHLORIDE 200 MG: 100 INJECTION, SOLUTION INTRAMUSCULAR; INTRAVENOUS at 11:20

## 2020-04-18 RX ADMIN — FLUTICASONE PROPIONATE 2 PUFF: 110 AEROSOL, METERED RESPIRATORY (INHALATION) at 19:47

## 2020-04-18 RX ADMIN — UMECLIDINIUM 1 PUFF: 62.5 AEROSOL, POWDER ORAL at 12:36

## 2020-04-18 RX ADMIN — CEFTRIAXONE 1 G: 1 INJECTION, POWDER, FOR SOLUTION INTRAMUSCULAR; INTRAVENOUS at 14:23

## 2020-04-18 RX ADMIN — FLUTICASONE PROPIONATE 2 PUFF: 110 AEROSOL, METERED RESPIRATORY (INHALATION) at 12:36

## 2020-04-18 NOTE — H&P
[facility]    History and Physical  Hospitalist       Date of Admission:  4/17/2020  Date of Service (when I saw the patient): 4/17/20    Assessment & Plan   Kylah Carrasquillo is a pleasant 84 year old woman who presented to the ED today for evaluation of intermittent confusion over the past 5-6 days.  Current problems include:    Intermittent confusion/word-finding difficulty; unclear cause.  Cannot rule out seizure disorder.  - add seizure precautions  - Neurology eval. 4/18 if possible  - rule out Covid-19 infection as possible contributor (ordered already in ED)    Recent diarrhea in setting of previous C. Diff. Infection; likely has resolved.  Tested + (for C. Diff) in 11/2019, but negative in 1/2020 and then earlier this month.  - monitor for any recurrent Sx.  - check for Covid-19 infection, as above    Bacteriuria; no other urinary Sx.  - f/u UCx begun today; hold on further ABx for now.    HTN; pressures higher this evening.  - resume PTA amlodipine  - add PRN clonidine    Asthma, chronic and controlled.  - resume PTA Flovent/Spiriva and PRN albuterol MDI    Left hand discomfort/cramping; unclear cause.  Could be related to her previous Hx of Raynaud's.  - monitor      DVT Prophylaxis: Ambulate every shift and ad evens in room  Code Status: Full Code    Disposition: Expected discharge in 1-2 days.      BILL Kim MD, [facility]     Internal Medicine Hospitalist    Primary Care Physician   Azucena Almanzar    Chief Complaint   Acute confusion x 5-6 days.    History was obtained from the ED provider, the EHR and patient.    History of Present Illness   Per ED provider: Kylah Carrasquillo is a 84 year old year old woman who presents for evaluation of altered mental status with memory loss since Sunday.  History is per the patient and her son (over the phone).  The patient was slumped over today and she seemed even more confused per her son.  She is normally very sharp and intelligent, so this  "is very unusual for her. She has had diarrhea for 3 weeks and was recently tested negative for C-Diff  She had C-Diff and a \"parasite infection\" last August after a trip to [facility].  Diarrhea is now resolved.  She denies vomiting, blood in stools, fever/chills, cough, chest pain, SOB, runny nose.  Addendum: Kylah thinks her diarrhea resolved about a week ago.  No recent headache, falls or change in vision.  Has had at least 2 episodes (while talking with her son, and then with her sister) in past 5 days where she apparently just stopped talking, was not aware of this, and did not recall that it had happened.  She also mentions an uncomfortable cramping-type sensation in (mainly) her Left hand and possibly her right - intermittently, over the past month; has not noticed any discoloration or temperature changes when this is happening.      Past Medical History    I have reviewed this patient's medical history and updated it with pertinent information if needed.   Past Medical History:   Diagnosis Date     Benign hypertension 1/29/2013     Cataracta 7/31/2014     First degree AV block 6/2/2014     Gastro-oesophageal reflux disease      Hyperlipidemia LDL goal <100 1/21/2013     Hypertension goal BP (blood pressure) < 140/90 3/23/2013     Mild intermittent asthma      Osteoporosis 12/12/2011     PONV (postoperative nausea and vomiting)      Raynaud's syndrome      Unspecified cardiovascular disease        Past Surgical History   I have reviewed this patient's surgical history and updated it with pertinent information if needed.  Past Surgical History:   Procedure Laterality Date     CYSTOCELE REPAIR  1970's     HEMORRHOIDECTOMY  1970's     HERNIA REPAIR, UMBILICAL  1970's     HYSTERECTOMY, VAGINAL  1988    menorrhagia     PHACOEMULSIFICATION CLEAR CORNEA WITH STANDARD INTRAOCULAR LENS IMPLANT  8/7/2014    Procedure: PHACOEMULSIFICATION CLEAR CORNEA WITH STANDARD INTRAOCULAR LENS IMPLANT;  Surgeon: Austin Garcia " MD CELINE;  Location:  [facility]     PHACOEMULSIFICATION CLEAR CORNEA WITH STANDARD INTRAOCULAR LENS IMPLANT Left 9/25/2014    Procedure: PHACOEMULSIFICATION CLEAR CORNEA WITH STANDARD INTRAOCULAR LENS IMPLANT;  Surgeon: Austin Garcia MD;  Location:  [facility]     TONSILLECTOMY  1945     TUBAL LIGATION  1970's       Prior to Admission Medications   Prior to Admission Medications   Prescriptions Last Dose Informant Patient Reported? Taking?   Multiple Vitamins-Minerals (MULTIVITAMIN PO)   Yes No   Omega-3 Fatty Acids (OMEGA-3 FISH OIL PO)   Yes No   albuterol (VENTOLIN HFA) 108 (90 Base) MCG/ACT inhaler   No No   Sig: Inhale 2 puffs into the lungs 4 times daily as needed   amLODIPine (NORVASC) 2.5 MG tablet   No No   Sig: Take 1 tablet (2.5 mg) by mouth daily   cetirizine (ZYRTEC) 10 MG tablet   No No   Sig: Take 1 tablet (10 mg) by mouth daily   Patient not taking: Reported on 4/6/2020   fluticasone (FLOVENT HFA) 110 MCG/ACT inhaler   No No   Sig: Inhale 2 puffs into the lungs 2 times daily   tiotropium (SPIRIVA HANDIHALER) 18 MCG inhaled capsule   No No   Sig: INHALE  THE CONTENTS OF 1 CAPSULE ONCE DAILY      Facility-Administered Medications: None     Allergies   Allergies   Allergen Reactions     Benadryl [Diphenhydramine Hcl]      Morphine      Penicillins        Social History   I have reviewed this patient's social history and updated it with pertinent information if needed. Kylah Carrasquillo  reports that she quit smoking about 29 years ago. She has never used smokeless tobacco. She reports current alcohol use. She reports that she does not use drugs.   Addendum: is retired and ; has 7 children.  Moved here from [place] years ago to be closer to her son and sister.    Family History   I have reviewed this patient's family history and updated it with pertinent information if needed.   Family History   Problem Relation Age of Onset     Asthma Mother      Osteoporosis Mother      C.A.D. Father      Diabetes  Daughter      Thyroid Disease Daughter      TEMITOPE.A.D. Brother      TEMITOPE.A.D. Brother      Thyroid Disease Sister      Diabetes Brother      Hypertension No family hx of      Breast Cancer No family hx of      Cancer - colorectal No family hx of      Anesthesia Reaction No family hx of      Blood Disease No family hx of      Eye Disorder No family hx of        Review of Systems   The 10 point Review of Systems is negative other than noted in the HPI or here.     Physical Exam   Temp: 97.7  F (36.5  C) Temp src: Oral BP: (!) 124/92 Pulse: 76 Heart Rate: 74 Resp: 16 SpO2: 99 % O2 Device: None (Room air)    Vital Signs with Ranges  Temp:  [97.7  F (36.5  C)] 97.7  F (36.5  C)  Pulse:  [60-77] 76  Heart Rate:  [74] 74  Resp:  [16] 16  BP: (124-151)/(64-92) 124/92  SpO2:  [96 %-99 %] 99 %  120 lbs 0 oz    Constitutional: awake, lying in bed; in no apparent distress  Eyes: sclerae clear; PERRLA/EOMI  HEENT: AT/NC; moist mucous membranes, normal dentition.  Neck: supple, good ROM; no LA, no JVD, no thyromegaly    Respiratory: good air entry bilaterally; no wheezing or rhonchi.  Back: no focal tenderness or other abnormalities  Cardiovascular: Regular rate and rhythm; S1, S2 noted; no murmur, rub or gallop  GI: abdomen flat, + bowel sounds, soft, non-tender, non-distended; no masses or organomegaly  Skin: no rash, no cyanosis  Musculoskeletal: no edema; no obvious joint abnormalities; hands/feet warm and well-perfused.  Neurologic: alert; oriented to person, date, place; follows directions well; no focal motor or sensory deficits  Psychiatric: no obvious signs of anxiety or depression.     Data   Data reviewed today:  I personally reviewed all recent labs and imaging results.    Recent Labs   Lab 04/17/20  1436   WBC 8.7   HGB 14.4   MCV 98         POTASSIUM 3.9   CHLORIDE 104   CO2 27   BUN 13   CR 0.65   ANIONGAP 6   ELVIRA 9.4   GLC 96   ALBUMIN 3.7   PROTTOTAL 7.4   BILITOTAL 0.3   ALKPHOS 55   ALT 18   AST 9   TROPI  <0.015       Recent Results (from the past 24 hour(s))   XR Chest Port 1 View    Narrative    CHEST ONE VIEW  4/17/2020 3:00 PM     HISTORY: Confusion.    COMPARISON: February 15, 2016      Impression    IMPRESSION: No acute disease.    SHAYY LOWERY MD   MR Brain w/o Contrast    Narrative    MRI OF THE BRAIN WITHOUT CONTRAST 4/17/2020 6:38 PM     COMPARISON: None.    HISTORY: Confusion     TECHNIQUE:   Brain: Axial diffusion-weighted with ADC map, T2-weighted with fat  saturation, T1-weighted and turboFLAIR and sagittal T1-weighted images  of the brain were obtained without intravenous contrast.     FINDINGS: There is moderate diffuse cerebral volume loss. There are  numerous scattered focal and extensive confluent periventricular areas  of abnormal T2 signal hyperintensity in the cerebral white matter  bilaterally that are consistent with sequela of chronic small vessel  ischemic disease.    The ventricles and basal cisterns are within normal limits in  configuration given the degree of cerebral volume loss. There is no  midline shift. There are no extra-axial fluid collections.  There is  no evidence for stroke or acute intracranial hemorrhage.    There is no sinusitis or mastoiditis.      Impression    IMPRESSION: Diffuse cerebral volume loss and cerebral white matter  changes consistent with chronic small vessel ischemic disease. No  evidence for acute intracranial pathology.    KESHA HUITRON MD

## 2020-04-18 NOTE — ED NOTES
[facility]  ED Nurse Handoff Report    ED Chief complaint: Altered Mental Status      ED Diagnosis:   Final diagnoses:   None       Code Status: Full Code    Allergies:   Allergies   Allergen Reactions     Benadryl [Diphenhydramine Hcl]      Morphine      Penicillins        Patient Story: altered mental status with memory loss since Sunday.  History is per the patient and her son (over the phone).  The patient was slumped over today and she seemed even more confused per her son.  She is normally very sharp and intelligent, so this is very unusual for her. no vomiting, blood in stools, fever/chills, cough, chest pain, SOB, runny nose     Focused Assessment:  Altered mental status    Treatments and/or interventions provided: rocephin  Patient's response to treatments and/or interventions:     To be done/followed up on inpatient unit:      Does this patient have any cognitive concerns?: alert and oriented    Activity level - Baseline/Home:  Independent  Activity Level - Current:   Independent    Patient's Preferred language: English   Needed?: No    Isolation: Enteric due to diarrhea for past 3 weeks with negative c-diff test.  Pt on covid percautions  Infection:   R/O covid, had negative c-diff test  Bariatric?: No    Vital Signs:   Vitals:    04/17/20 1700 04/17/20 1715 04/17/20 1730 04/17/20 1745   BP: 137/70  (!) 124/92    Pulse: 71  76    Resp:       Temp:       TempSrc:       SpO2: 97% 98% 96% 99%   Weight:       Height:           Cardiac Rhythm:     Was the PSS-3 completed:   Yes  What interventions are required if any?               Family Comments:   OBS brochure/video discussed/provided to patient/family: Yes              Name of person given brochure if not patient:               Relationship to patient:     For the majority of the shift this patient's behavior was Green.   Behavioral interventions performed were .    ED NURSE PHONE NUMBER: [phone]

## 2020-04-18 NOTE — PROGRESS NOTES
OBSERVATION GOALS     -diagnostic tests and consults completed and resulted - in progress  -vital signs normal or at patient baseline - met  -tolerating oral intake to maintain hydration - met  -returns to baseline functional status - in progress  -safe disposition plan has been identified - not met    Nurse to notify provider when observation goals have been met and patient is ready for discharge.

## 2020-04-18 NOTE — PLAN OF CARE
Covid testing: in progress.    AxO x4, forgetful, sometimes confused. VSS, RA. VSS, RA. Denies pain. Seizure precautions. LS clear. Denies SOB, cough. Up independently to the bathroom. Calls appropriately. Regular diet. Discharge pending covid results/plan.

## 2020-04-18 NOTE — CONSULTS
"Note: patient was seen during the covid-19 pandemic emergency.        [facility]    Neurology Consultation     Date of Admission:  4/17/2020      Assessment & Plan   Kylah Carrasquillo is a 84 year old female who was admitted on 4/17/2020. I was asked to see the patient for acute altered mental status.  Episodes of speech arrest consistent with brief focal seizures.  She is at baseline now.    --EEG: unable to perform until covid-19 results back and negative (as per [facility] protocol).  --MRI brain: extensive age related changes- atrophy and csvid.  No acute findings.  --Thiamine 200 mg IV given once on 4-.  --Her and her son's history are most consistent with focal seizures.  MRI shows nothing acute, she is not acutely ill and labs are normal.  Will start Keppra 500 mg po bid.  If covid comes back negative, will consider EEG.  --In terms of dispo, her plan is to isolate with some of her good friends, some of whom have serious medical conditions, so she will need covid-19 testing results before discharge.        I discussed the above diagnosis, assessment, and further testing with the patient and her son, Shimon.  Total time: 70 Minutes, with more than 50% of the time counseling the patient or coordination of care.       Cinda Vazquez MD    Code Status    Full Code        Reason for Consult   Reason for consult: I was asked by Dr. Kim to evaluate this patient for encephalopathy.      Chief Complaint   confusion    History is obtained from the patient and the electronic medical chart.    History of Present Illness   Kylah Carrasquillo is a 84 year old female who presents with confusion    Per ER notes: \"Kylah Carrasquillo is a 84 year old year old female who presents for evaluation of altered mental status with memory loss since Sunday.  History is per the patient and her son (over the phone).  The patient was slumped over today and she seemed even more confused per her son.  She is " "normally very sharp and intelligent, so this is very unusual for her. She has had diarrhea for 3 weeks and was recently tested negative for C-Diff  She had C-Diff and a \"parasite infection\" last August after a trip to [facility].  Diarrhea is now resolved.  She denies vomiting, blood in stools, fever/chills, cough, chest pain, SOB, runny nose.\"    Per hospitalist note, she had 2 episodes in past 5 days of sudden cessation of talking, was not aware of it.  She notes intermittent cramping of left hand, rarely right hand.    Over video She tells me that on Easter am she had an episode while alone.  She could not remember things, which is unusual for her.  Then another episode.      I called Shimon, her son, he tells me that she called last Sunday, said that she felt odd, had memory loss.  Throughout week she was getting worse.  She could not remember what she was eating.  She had been isolated for 5 weeks, so less interaction than before.  Wednesday and Thursday she seemed better. Friday, seemed worse, then son called, she was intermittently responding.  When he got there, she was slumped down in a daze.  Did not respond verbally about going to the ER.            Past Medical History   I have reviewed this patient's medical history and updated it with pertinent information if needed.   Past Medical History:   Diagnosis Date     Benign hypertension 1/29/2013     Cataracta 7/31/2014     First degree AV block 6/2/2014     Gastro-oesophageal reflux disease      Hyperlipidemia LDL goal <100 1/21/2013     Hypertension goal BP (blood pressure) < 140/90 3/23/2013     Mild intermittent asthma      Osteoporosis 12/12/2011     PONV (postoperative nausea and vomiting)      Raynaud's syndrome      Unspecified cardiovascular disease        Past Surgical History   I have reviewed this patient's surgical history and updated it with pertinent information if needed.  Past Surgical History:   Procedure Laterality Date     CYSTOCELE REPAIR  " 1970's     HEMORRHOIDECTOMY  1970's     HERNIA REPAIR, UMBILICAL  1970's     HYSTERECTOMY, VAGINAL  1988    menorrhagia     PHACOEMULSIFICATION CLEAR CORNEA WITH STANDARD INTRAOCULAR LENS IMPLANT  8/7/2014    Procedure: PHACOEMULSIFICATION CLEAR CORNEA WITH STANDARD INTRAOCULAR LENS IMPLANT;  Surgeon: Austin Garcia MD;  Location: [facility]     PHACOEMULSIFICATION CLEAR CORNEA WITH STANDARD INTRAOCULAR LENS IMPLANT Left 9/25/2014    Procedure: PHACOEMULSIFICATION CLEAR CORNEA WITH STANDARD INTRAOCULAR LENS IMPLANT;  Surgeon: Austin Garcia MD;  Location: [facility]     TONSILLECTOMY  1945     TUBAL LIGATION  1970's       Prior to Admission Medications   Prior to Admission Medications   Prescriptions Last Dose Informant Patient Reported? Taking?   Vitamin D, Cholecalciferol, 25 MCG (1000 UT) TABS 4/17/2020 at am Self Yes Yes   Sig: Take 1,000 Units by mouth daily   albuterol (VENTOLIN HFA) 108 (90 Base) MCG/ACT inhaler  at prn Self No Yes   Sig: Inhale 2 puffs into the lungs 4 times daily as needed   amLODIPine (NORVASC) 2.5 MG tablet 4/17/2020 at am Self No Yes   Sig: Take 1 tablet (2.5 mg) by mouth daily   fluticasone (FLOVENT HFA) 110 MCG/ACT inhaler 4/17/2020 at am Self No Yes   Sig: Inhale 2 puffs into the lungs 2 times daily   multivitamin w/minerals (THERA-VIT-M) tablet 4/17/2020 at am Self Yes Yes   Sig: Take 1 tablet by mouth daily   omega-3 acid ethyl esters (LOVAZA) 1 g capsule 4/17/2020 at am Self Yes Yes   Sig: Take 1 g by mouth daily   tiotropium (SPIRIVA HANDIHALER) 18 MCG inhaled capsule 4/17/2020 at am Self No Yes   Sig: INHALE  THE CONTENTS OF 1 CAPSULE ONCE DAILY      Facility-Administered Medications: None     Allergies   Allergies   Allergen Reactions     Benadryl [Diphenhydramine Hcl]      Morphine      Penicillins        Social History   I have reviewed this patient's social history and updated it with pertinent information if needed. Kylah CHERYL Carrasquillo  reports that she quit smoking  "about 29 years ago. She has never used smokeless tobacco. She reports current alcohol use. She reports that she does not use drugs.    Family History   I have reviewed this patient's family history and updated it with pertinent information if needed.   Family History   Problem Relation Age of Onset     Asthma Mother      Osteoporosis Mother      C.A.D. Father      Diabetes Daughter      Thyroid Disease Daughter      C.A.D. Brother      C.A.D. Brother      Thyroid Disease Sister      Diabetes Brother      Hypertension No family hx of      Breast Cancer No family hx of      Cancer - colorectal No family hx of      Anesthesia Reaction No family hx of      Blood Disease No family hx of      Eye Disorder No family hx of        Review of Systems   The 10 point Review of Systems is negative other than noted in the HPI.    Physical Exam   Temp: 97.5  F (36.4  C) Temp src: Oral BP: 122/52 Pulse: 64 Heart Rate: 77 Resp: 16 SpO2: 95 % O2 Device: None (Room air)    Vital Signs with Ranges  Temp:  [96.4  F (35.8  C)-97.7  F (36.5  C)] 97.5  F (36.4  C)  Pulse:  [60-77] 64  Heart Rate:  [64-77] 77  Resp:  [16] 16  BP: (122-162)/() 122/52  SpO2:  [95 %-99 %] 95 %  128 lbs 11.2 oz    My exam was done over video conferencing, which was cut short due to connection issues after about 10 minutes, and as follows:  A,A, NAD.  Speech normal, some forgetfulness of words (eg \"dehydrated\") but generally able to speak in full sentences and describe what is going on.  EOMI, face equal and symmetric, BUE normal movements.  No apraxia.        Per Hospitalist exam,   \"Constitutional: awake, lying in bed; in no apparent distress  Eyes: sclerae clear; PERRLA/EOMI  HEENT: AT/NC; moist mucous membranes, normal dentition.  Neck: supple, good ROM; no LA, no JVD, no thyromegaly    Respiratory: good air entry bilaterally; no wheezing or rhonchi.  Back: no focal tenderness or other abnormalities  Cardiovascular: Regular rate and rhythm; S1, S2 " "noted; no murmur, rub or gallop  GI: abdomen flat, + bowel sounds, soft, non-tender, non-distended; no masses or organomegaly  Skin: no rash, no cyanosis  Musculoskeletal: no edema; no obvious joint abnormalities; hands/feet warm and well-perfused.  Neurologic: alert; oriented to person, date, place; follows directions well; no focal motor or sensory deficits  Psychiatric: no obvious signs of anxiety or depression. \"        Data   Results for orders placed or performed during the hospital encounter of 04/17/20 (from the past 24 hour(s))   CBC with platelets differential   Result Value Ref Range    WBC 8.7 4.0 - 11.0 10e9/L    RBC Count 4.53 3.8 - 5.2 10e12/L    Hemoglobin 14.4 11.7 - 15.7 g/dL    Hematocrit 44.3 35.0 - 47.0 %    MCV 98 78 - 100 fl    MCH 31.8 26.5 - 33.0 pg    MCHC 32.5 31.5 - 36.5 g/dL    RDW 13.6 10.0 - 15.0 %    Platelet Count 337 150 - 450 10e9/L    Diff Method Automated Method     % Neutrophils 57.4 %    % Lymphocytes 31.8 %    % Monocytes 8.4 %    % Eosinophils 1.3 %    % Basophils 0.5 %    % Immature Granulocytes 0.6 %    Nucleated RBCs 0 0 /100    Absolute Neutrophil 5.0 1.6 - 8.3 10e9/L    Absolute Lymphocytes 2.8 0.8 - 5.3 10e9/L    Absolute Monocytes 0.7 0.0 - 1.3 10e9/L    Absolute Eosinophils 0.1 0.0 - 0.7 10e9/L    Absolute Basophils 0.0 0.0 - 0.2 10e9/L    Abs Immature Granulocytes 0.1 0 - 0.4 10e9/L    Absolute Nucleated RBC 0.0    Comprehensive metabolic panel   Result Value Ref Range    Sodium 137 133 - 144 mmol/L    Potassium 3.9 3.4 - 5.3 mmol/L    Chloride 104 94 - 109 mmol/L    Carbon Dioxide 27 20 - 32 mmol/L    Anion Gap 6 3 - 14 mmol/L    Glucose 96 70 - 99 mg/dL    Urea Nitrogen 13 7 - 30 mg/dL    Creatinine 0.65 0.52 - 1.04 mg/dL    GFR Estimate 81 >60 mL/min/[1.73_m2]    GFR Estimate If Black >90 >60 mL/min/[1.73_m2]    Calcium 9.4 8.5 - 10.1 mg/dL    Bilirubin Total 0.3 0.2 - 1.3 mg/dL    Albumin 3.7 3.4 - 5.0 g/dL    Protein Total 7.4 6.8 - 8.8 g/dL    Alkaline " Phosphatase 55 40 - 150 U/L    ALT 18 0 - 50 U/L    AST 9 0 - 45 U/L   Troponin I   Result Value Ref Range    Troponin I ES <0.015 0.000 - 0.045 ug/L   CRP inflammation   Result Value Ref Range    CRP Inflammation <2.9 0.0 - 8.0 mg/L   UA with Microscopic   Result Value Ref Range    Color Urine Light Yellow     Appearance Urine Clear     Glucose Urine Negative NEG^Negative mg/dL    Bilirubin Urine Negative NEG^Negative    Ketones Urine Negative NEG^Negative mg/dL    Specific Gravity Urine 1.002 (L) 1.003 - 1.035    Blood Urine Negative NEG^Negative    pH Urine 7.0 5.0 - 7.0 pH    Protein Albumin Urine Negative NEG^Negative mg/dL    Urobilinogen mg/dL Normal 0.0 - 2.0 mg/dL    Nitrite Urine Negative NEG^Negative    Leukocyte Esterase Urine Small (A) NEG^Negative    Source Midstream Urine     WBC Urine 4 0 - 5 /HPF    RBC Urine 0 0 - 2 /HPF    Bacteria Urine Many (A) NEG^Negative /HPF    Squamous Epithelial /HPF Urine 1 0 - 1 /HPF   Urine Culture    Specimen: Midstream Urine   Result Value Ref Range    Specimen Description Midstream Urine     Special Requests Specimen received in preservative     Culture Micro PENDING    Blood culture    Specimen: Blood    Right Arm   Result Value Ref Range    Specimen Description Blood Right Arm     Culture Micro No growth after 14 hours    Lactic acid whole blood   Result Value Ref Range    Lactic Acid 1.2 0.7 - 2.0 mmol/L   Procalcitonin   Result Value Ref Range    Procalcitonin <0.05 ng/ml   XR Chest Port 1 View    Narrative    CHEST ONE VIEW  4/17/2020 3:00 PM     HISTORY: Confusion.    COMPARISON: February 15, 2016      Impression    IMPRESSION: No acute disease.    SHAYY LOWERY MD   Blood culture    Specimen: Blood    Right Arm   Result Value Ref Range    Specimen Description Blood Right Arm     Culture Micro No growth after 14 hours    MR Brain w/o Contrast    Narrative    MRI OF THE BRAIN WITHOUT CONTRAST 4/17/2020 6:38 PM     COMPARISON: None.    HISTORY: Confusion      TECHNIQUE:   Brain: Axial diffusion-weighted with ADC map, T2-weighted with fat  saturation, T1-weighted and turboFLAIR and sagittal T1-weighted images  of the brain were obtained without intravenous contrast.     FINDINGS: There is moderate diffuse cerebral volume loss. There are  numerous scattered focal and extensive confluent periventricular areas  of abnormal T2 signal hyperintensity in the cerebral white matter  bilaterally that are consistent with sequela of chronic small vessel  ischemic disease.    The ventricles and basal cisterns are within normal limits in  configuration given the degree of cerebral volume loss. There is no  midline shift. There are no extra-axial fluid collections.  There is  no evidence for stroke or acute intracranial hemorrhage.    There is no sinusitis or mastoiditis.      Impression    IMPRESSION: Diffuse cerebral volume loss and cerebral white matter  changes consistent with chronic small vessel ischemic disease. No  evidence for acute intracranial pathology.    KESHA HUITRON MD   TSH with free T4 reflex (Add on or recollect)   Result Value Ref Range    TSH 2.03 0.40 - 4.00 mU/L   Ammonia   Result Value Ref Range    Ammonia 14 10 - 50 umol/L           Imaging and procedures:  I personally reviewed these images.  MRI brain: CSVID, atrophy, nothing acute

## 2020-04-18 NOTE — PROGRESS NOTES
RECEIVING UNIT ED HANDOFF REVIEW    ED Nurse Handoff Report was reviewed by: Mckenzie Bynum RN on April 17, 2020 at 8:59 PM

## 2020-04-18 NOTE — PROGRESS NOTES
SW:  D: SW consult acknowledged. Awaiting OT discharge recommendations.   P: Will continue to follow.      SIMÓN Barrow

## 2020-04-18 NOTE — PHARMACY-ADMISSION MEDICATION HISTORY
Pharmacy Medication History  Admission medication history interview status for the 4/17/2020  admission is complete. See EPIC admission navigator for prior to admission medications     Medication history sources: Spoke w/ patient.  Reviewed recent fill history through [employer].   Medication history source reliability: Moderate  Adherence assessment: Moderate    Medication reconciliation completed by provider prior to medication history? Yes    Time spent in this activity: 20 minutes      Prior to Admission medications    Medication Sig Last Dose Taking? Auth Provider   albuterol (VENTOLIN HFA) 108 (90 Base) MCG/ACT inhaler Inhale 2 puffs into the lungs 4 times daily as needed  at prn Yes Azucena Almanzar MD   amLODIPine (NORVASC) 2.5 MG tablet Take 1 tablet (2.5 mg) by mouth daily 4/17/2020 at am Yes Azucena Almanzar MD   fluticasone (FLOVENT HFA) 110 MCG/ACT inhaler Inhale 2 puffs into the lungs 2 times daily 4/17/2020 at am Yes Azucena Almanzar MD   multivitamin w/minerals (THERA-VIT-M) tablet Take 1 tablet by mouth daily 4/17/2020 at am Yes Unknown, Entered By History   omega-3 acid ethyl esters (LOVAZA) 1 g capsule Take 1 g by mouth daily 4/17/2020 at am Yes Unknown, Entered By History   tiotropium (SPIRIVA HANDIHALER) 18 MCG inhaled capsule INHALE  THE CONTENTS OF 1 CAPSULE ONCE DAILY 4/17/2020 at am Yes Azucena Almanzar MD   Vitamin D, Cholecalciferol, 25 MCG (1000 UT) TABS Take 1,000 Units by mouth daily 4/17/2020 at am Yes Unknown, Entered By History     Tiffani Restrepo, PharmD, BCPS

## 2020-04-18 NOTE — PROGRESS NOTES
[facility]    Hospitalist Progress Note    Assessment & Plan   Kylah Carrasquillo is a pleasant 84 year old woman who presented to the ED today for evaluation of intermittent confusion over the past 5-6 days.  Current problems include:     Intermittent confusion/word-finding difficulty; unclear cause.  Cannot rule out seizure disorder. Concern for complex partial SZ.   Nl bmp, cbc, troponins, tsh, ammonia, LFTs and UA, ucx growing bacteria but doubt uti- no urinary symptoms  CRP negative, procal negativek, lactate negative  bld cx ngtd  CXR negative:      MRI brain:                                                               IMPRESSION: Diffuse cerebral volume loss and cerebral white matter  changes consistent with chronic small vessel ischemic disease. No  evidence for acute intracranial pathology.    Discussed case with pt's son. Per son, pt has complained of being forgetful for last 2 weeks or so. Not remembering dates, recent events. On phone/face time family and friends note pt is forgetful not remembering having certain tests. Had a long pause in conversation with sister on Tuesday and with friends yesterday. Friends called son yesterday given pause in conversation and forgetfulness. He called pt and she had pause in conversation, phone seemed to be put down and he could hear her moving around.  He went over to see pt and found her without focal findings and laying in chair.     Nl neuro exam admission.   Today: nl vitals. Afebrile. Negative w/u thus far. Nl neuro exam  Had rocephin in ed  No symptoms. Feels her usual self  No neuro sxs  ekg nl    Plan:   -COVID testing pending. Unclear if specimen was lost per charge RN, given this was pursued in ED will check now if sample truly lost.  patient is low risk and isolation can be discontinued if negative. Discussed with charge RN  -keppra 500mg bid per neurology for possible focal sz  -tele  -EEG once covid testing completed and negative  -  seizure precautions  - Neurology eval. 4/18 if possible  - rule out Covid-19 infection as possible contributor (ordered already in ED)  -b12, folate level  -isolation for now until covid testing resulted  - covid testing in process   -thiamine 200mg iv X1 per neuro  -3 days Rocephin for possible uti  -OT cognitive eval  - per son, pt will be staying with friends at discharge (friends have been in isolation for over 2 weeks)       Recent diarrhea in setting of previous C. Diff. Infection; likely has resolved.  Tested + (for C. Diff) in 11/2019, but negative in 1/2020 and then earlier this month. Stool testing negative. 3 formed stools per day for last several days. Now eating fine.   No further diarrhea. Benign abd exam  - monitor for any recurrent Sx.  - check for Covid-19 infection, as above     Bacteriuria; no other urinary Sx.  - f/u UCx begun today; will give 3 day course of rocephin      HTN; pressures higher this evening.  - resume PTA amlodipine  - add PRN clonidine     Asthma, chronic and controlled.  - resume PTA Flovent/Spiriva and PRN albuterol MDI     Left hand discomfort/cramping; unclear cause.  Could be related to her previous Hx of Raynaud's.  - monitor        DVT Prophylaxis: Ambulate every shift and ad evens in room  Code Status: Full Code     Disposition: Expected discharge in 1-2 days.    Aaron Lopez MD  Text Page  (7am to 6pm)  Interval History   Not the two phone conversations with son and sister in which she had difficulty talking  No cp, dizziness, falls, jt pain, abd pain vomiting. No syncope  Has had slightl nausea last 2 days but resolved.       -Data reviewed today: I reviewed all new labs and imaging results over the last 24 hours. I personally reviewed imaging and labs since admission    Physical Exam   Temp: 97.5  F (36.4  C) Temp src: Oral BP: 122/52 Pulse: 64 Heart Rate: 77 Resp: 16 SpO2: 95 % O2 Device: None (Room air)    Vitals:    04/17/20 1330 04/17/20 2207   Weight: 54.4 kg  (120 lb) 58.4 kg (128 lb 11.2 oz)     Vital Signs with Ranges  Temp:  [96.4  F (35.8  C)-97.7  F (36.5  C)] 97.5  F (36.4  C)  Pulse:  [60-77] 64  Heart Rate:  [64-77] 77  Resp:  [16] 16  BP: (122-162)/() 122/52  SpO2:  [95 %-99 %] 95 %  No intake/output data recorded.    Constitutional: Up in bed, nad  Respiratory: CTAB  Cardiovascular: RRR no r/g/m  GI: soft, nt, nd  Skin/Integumen: no rash or edema  Psych: nl affect  Neuro: CN 2-12 grossly intact, strength 5/5 extrem X 4, nl finger nose finger, fully oriented, face symmetric, nl naming and speech.        Medications     - MEDICATION INSTRUCTIONS -         sodium chloride 0.9%  1,000 mL Intravenous Once     amLODIPine  2.5 mg Oral Daily     fluticasone  2 puff Inhalation BID     umeclidinium  1 puff Inhalation Daily       Data   Recent Labs   Lab 04/17/20  1436   WBC 8.7   HGB 14.4   MCV 98         POTASSIUM 3.9   CHLORIDE 104   CO2 27   BUN 13   CR 0.65   ANIONGAP 6   ELVIRA 9.4   GLC 96   ALBUMIN 3.7   PROTTOTAL 7.4   BILITOTAL 0.3   ALKPHOS 55   ALT 18   AST 9   TROPI <0.015       Imaging:   Recent Results (from the past 24 hour(s))   XR Chest Port 1 View    Narrative    CHEST ONE VIEW  4/17/2020 3:00 PM     HISTORY: Confusion.    COMPARISON: February 15, 2016      Impression    IMPRESSION: No acute disease.    SHAYY LOWERY MD   MR Brain w/o Contrast    Narrative    MRI OF THE BRAIN WITHOUT CONTRAST 4/17/2020 6:38 PM     COMPARISON: None.    HISTORY: Confusion     TECHNIQUE:   Brain: Axial diffusion-weighted with ADC map, T2-weighted with fat  saturation, T1-weighted and turboFLAIR and sagittal T1-weighted images  of the brain were obtained without intravenous contrast.     FINDINGS: There is moderate diffuse cerebral volume loss. There are  numerous scattered focal and extensive confluent periventricular areas  of abnormal T2 signal hyperintensity in the cerebral white matter  bilaterally that are consistent with sequela of chronic small  vessel  ischemic disease.    The ventricles and basal cisterns are within normal limits in  configuration given the degree of cerebral volume loss. There is no  midline shift. There are no extra-axial fluid collections.  There is  no evidence for stroke or acute intracranial hemorrhage.    There is no sinusitis or mastoiditis.      Impression    IMPRESSION: Diffuse cerebral volume loss and cerebral white matter  changes consistent with chronic small vessel ischemic disease. No  evidence for acute intracranial pathology.    KESHA HUITRON MD

## 2020-04-19 ENCOUNTER — APPOINTMENT (OUTPATIENT)
Dept: OCCUPATIONAL THERAPY | Facility: CLINIC | Age: 85
DRG: 101 | End: 2020-04-19
Attending: PHYSICIAN ASSISTANT
Payer: COMMERCIAL

## 2020-04-19 PROBLEM — R56.9 SEIZURE (H): Status: ACTIVE | Noted: 2020-04-19

## 2020-04-19 LAB
BACTERIA SPEC CULT: ABNORMAL
Lab: ABNORMAL
SPECIMEN SOURCE: ABNORMAL

## 2020-04-19 PROCEDURE — 25000128 H RX IP 250 OP 636: Performed by: PHYSICIAN ASSISTANT

## 2020-04-19 PROCEDURE — 25000132 ZZH RX MED GY IP 250 OP 250 PS 637: Performed by: PHYSICIAN ASSISTANT

## 2020-04-19 PROCEDURE — 97166 OT EVAL MOD COMPLEX 45 MIN: CPT | Mod: GO | Performed by: OCCUPATIONAL THERAPIST

## 2020-04-19 PROCEDURE — 97535 SELF CARE MNGMENT TRAINING: CPT | Mod: GO | Performed by: OCCUPATIONAL THERAPIST

## 2020-04-19 PROCEDURE — 96376 TX/PRO/DX INJ SAME DRUG ADON: CPT

## 2020-04-19 PROCEDURE — 12000000 ZZH R&B MED SURG/OB

## 2020-04-19 PROCEDURE — G0378 HOSPITAL OBSERVATION PER HR: HCPCS

## 2020-04-19 PROCEDURE — 40000061 ZZH STATISTIC EEG TIME EA 10 MIN

## 2020-04-19 PROCEDURE — 95816 EEG AWAKE AND DROWSY: CPT

## 2020-04-19 PROCEDURE — 99232 SBSQ HOSP IP/OBS MODERATE 35: CPT | Performed by: INTERNAL MEDICINE

## 2020-04-19 RX ORDER — LEVETIRACETAM 500 MG/1
500 TABLET ORAL 2 TIMES DAILY
Qty: 120 TABLET | Refills: 0 | Status: SHIPPED | OUTPATIENT
Start: 2020-04-19

## 2020-04-19 RX ADMIN — LEVETIRACETAM 500 MG: 500 TABLET, FILM COATED ORAL at 20:27

## 2020-04-19 RX ADMIN — CEFTRIAXONE 1 G: 1 INJECTION, POWDER, FOR SOLUTION INTRAMUSCULAR; INTRAVENOUS at 13:24

## 2020-04-19 RX ADMIN — AMLODIPINE BESYLATE 2.5 MG: 2.5 TABLET ORAL at 08:37

## 2020-04-19 RX ADMIN — FLUTICASONE PROPIONATE 2 PUFF: 110 AEROSOL, METERED RESPIRATORY (INHALATION) at 12:10

## 2020-04-19 RX ADMIN — UMECLIDINIUM 1 PUFF: 62.5 AEROSOL, POWDER ORAL at 12:10

## 2020-04-19 RX ADMIN — LEVETIRACETAM 500 MG: 500 TABLET, FILM COATED ORAL at 08:37

## 2020-04-19 RX ADMIN — FLUTICASONE PROPIONATE 2 PUFF: 110 AEROSOL, METERED RESPIRATORY (INHALATION) at 20:27

## 2020-04-19 ASSESSMENT — ACTIVITIES OF DAILY LIVING (ADL): ADLS_ACUITY_SCORE: 11

## 2020-04-19 NOTE — PLAN OF CARE
A&Ox4. VSS on RA. Up SBA r/t seizure precautions. Speech eval today; NPO. Neuros intact. Plan for EEG today. Discharge pending.

## 2020-04-19 NOTE — PROGRESS NOTES
Hospitalist Cross Cover    Paged RE negative COVID-19 results. Reviewed Hospitalist rounder's note, discontinued contact/droplet COVID isolation precautions. Transfer to med tele.    Marguerite Owusu PA-C ([place])  Hospitalist [facility]  Pager: [phone]

## 2020-04-19 NOTE — PLAN OF CARE
AxO x4, forgetful at times. VSS on RA. Up SBA due to seizure precautions. NPO until seen by speech therapy. Neuros intact. COVID-19: negative. Discharge pending EEG.

## 2020-04-19 NOTE — PROGRESS NOTES
[facility]    Neurology Progress Note    Date of Service (when I saw the patient): 04/19/2020     Today's developments: EEG was normal.  No further events.  Continue Keppra 500 mg bid.  Covid-19 neg.  Follow up with me in 1 month.    Assessment & Plan   Kylah Carrasquillo is a 84 year old female who was admitted on 4/17/2020. I was asked to see the patient for acute altered mental status.  Episodes of speech arrest consistent with brief focal seizures.  She is at baseline now.     --EEG: normal awake and asleep (does not rule out previous seizures but is reassuring)  --Vit B-12 650, folate normal, tsh nl, ammonia normal, blood cultures neg.        --MRI brain: extensive age related changes- atrophy and csvid.  No acute findings.  --Thiamine 200 mg IV given once on 4-.  --Her and her son's history are most consistent with focal seizures.  MRI shows nothing acute, she is not acutely ill and labs are normal.   --UA mildly abnormal, treating as per hospitalist.  --Continue Keppra 500 mg po bid.   --Ok to discharge home tomorrow from neuro pov.        I discussed the above diagnosis, assessment, testing, and results with the patient and her son over the phone.  Total time: 40  Minutes, with more than 50% of the time counseling the patient or coordination of care.       Cinda Vazquez MD          Interval History   Doing well, no acute episodes of confusion or ams.  She does not feel her normal self yet.  Still having episodes of confusion, eg thought hospitalist visited her in the middle of the night when he did not. She seems very frustrated by not feeling herself.      Physical Exam   Temp: 98.2  F (36.8  C) Temp src: Oral BP: 130/68 Pulse: 64 Heart Rate: 60 Resp: 16 SpO2: 93 % O2 Device: None (Room air)    Vitals:    04/17/20 1330 04/17/20 2207   Weight: 54.4 kg (120 lb) 58.4 kg (128 lb 11.2 oz)     Vital Signs with Ranges  Temp:  [95.8  F (35.4  C)-98.2  F (36.8  C)] 98.2  F (36.8   C)  Pulse:  [64] 64  Heart Rate:  [60-67] 60  Resp:  [16] 16  BP: (112-135)/(56-79) 130/68  SpO2:  [93 %-99 %] 93 %  No intake/output data recorded.      General Appearance:  No acute distress  Neuro:       Mental Status Exam:    A,A, fully oriented.  Able to subtract serial 7s, able to spell world forward and backwards       Cranial Nerves:   EOMI, face equal and symmetric, speech normal           Motor:   5/5 BUE and BLE           Sensory:  Nl lt x4                   Coordination:   fnf normal bilaterally       Gait:  deferred   CV: RRR nl s1, s2  Resp: CTAB    Extremities: No clubbing, no cyanosis, no edema    Medications     - MEDICATION INSTRUCTIONS -         sodium chloride 0.9%  1,000 mL Intravenous Once     amLODIPine  2.5 mg Oral Daily     cefTRIAXone  1 g Intravenous Q24H     fluticasone  2 puff Inhalation BID     levETIRAcetam  500 mg Oral BID     umeclidinium  1 puff Inhalation Daily       Data   Results for orders placed or performed during the hospital encounter of 04/17/20 (from the past 24 hour(s))   EKG 12-lead, tracing only   Result Value Ref Range    Interpretation ECG Click View Image link to view waveform and result          Images and Procedures:  I personally reviewed the images of the following studies:  EEG: normal

## 2020-04-19 NOTE — PROGRESS NOTES
04/19/20 1613   Quick Adds   Type of Visit Initial Occupational Therapy Evaluation   Living Environment   Lives With alone   Living Arrangements house   Home Accessibility stairs within home  (has 3 levels from garage)   Number of Stairs, Within Home, Primary other (see comments)  (12 per level)   Stair Railings, Within Home, Primary railings on both sides of stairs   Transportation Anticipated car, drives self   Living Environment Comment pt has been independent at home   Functional Level   Ambulation 0-->independent   Transferring 0-->independent   Toileting 0-->independent   Bathing 0-->independent   Dressing 0-->independent   Eating 0-->independent   Communication 0-->understands/communicates without difficulty   Swallowing 0-->swallows foods/liquids without difficulty   Cognition 0 - no cognition issues reported   Fall history within last six months no   Which of the above functional risks had a recent onset or change? none   Prior Functional Level Comment Pt reports being independent wtih all ADLS and IADLs   General Information   Onset of Illness/Injury or Date of Surgery - Date 04/18/20   Referring Physician Marguerite Owusu   Patient/Family Goals Statement return home.  Considering staying with friends at [place]   Additional Occupational Profile Info/Pertinent History of Current Problem Pt admitted with altered mental status, possible seizures.  Pt having episodes of arrested speech consistent with brief focal seizures.  Intermittant confusion and word finding problems   Precautions/Limitations seizure precautions   General Observations pt in bed, willing to participate   Cognitive Status Examination   Orientation orientation to person, place and time   Level of Consciousness alert   Follows Commands (Cognition) follows one step commands   Memory impaired   Attention No deficits were identified   Visual Perception   Visual Perception Wears glasses   Pain Assessment   Patient Currently in Pain No   Range of  Motion (ROM)   ROM Quick Adds No deficits were identified   ROM Comment B UEs   Strength   Manual Muscle Testing Quick Adds No deficits were identified   Strength Comments B UEs   Coordination   Upper Extremity Coordination No deficits were identified   Mobility   Bed Mobility Bed mobility skill: Sit to supine;Bed mobility skill: Supine to sit   Bed Mobility Skill: Sit to Supine   Level of [place]: Sit/Supine stand-by assist   Physical Assist/Nonphysical Assist: Sit/Supine supervision;1 person assist   Bed Mobility Skill: Supine to Sit   Level of [place]: Supine/Sit stand-by assist   Physical Assist/Nonphysical Assist: Supine/Sit supervision;1 person assist   Transfer Skill: Sit to Stand   Level of [place]: Sit/Stand stand-by assist   Physical Assist/Nonphysical Assist: Sit/Stand verbal cues;1 person assist   Upper Body Dressing   Level of [place]: Dress Upper Body independent   Lower Body Dressing   Level of [place]: Dress Lower Body independent   Grooming   Level of [place]: Grooming independent   Eating/Self Feeding   Level of [place]: Eating independent   Activities of Daily Living Analysis   Impairments Contributing to Impaired Activities of Daily Living cognition impaired;fear and anxiety   General Therapy Interventions   Planned Therapy Interventions ADL retraining;cognition   Clinical Impression   Criteria for Skilled Therapeutic Interventions Met yes, treatment indicated   OT Diagnosis confusion and decreased safety for ADLS/IADLS   Influenced by the following impairments confusion and memory changes   Assessment of Occupational Performance 3-5 Performance Deficits   Identified Performance Deficits decreased safety for medication management, financial management, possibly driving   Clinical Decision Making (Complexity) Moderate complexity   Therapy Frequency Daily   Predicted Duration of Therapy Intervention (days/wks) 3 days   Anticipated Discharge Disposition Home with  "Outpatient Therapy   Risks and Benefits of Treatment have been explained. Yes   Patient, Family & other staff in agreement with plan of care Yes   [facility] TM \"6 Clicks\"   2016, Trustees of [facility], under license to [employer].  All rights reserved.   6 Clicks Short Forms Daily Activity Inpatient Short Form   [facility] AM-PAC  \"6 Clicks\" Daily Activity Inpatient Short Form   1. Putting on and taking off regular lower body clothing? 3 - A Little   2. Bathing (including washing, rinsing, drying)? 3 - A Little   3. Toileting, which includes using toilet, bedpan or urinal? 4 - None   4. Putting on and taking off regular upper body clothing? 4 - None   5. Taking care of personal grooming such as brushing teeth? 4 - None   6. Eating meals? 4 - None   Daily Activity Raw Score (Score out of 24.Lower scores equate to lower levels of function) 22   Total Evaluation Time   Total Evaluation Time (Minutes) 15     "

## 2020-04-19 NOTE — PLAN OF CARE
Discharge Planner OT     Pt is an 84 year old female admitted with altered mental status, possible seizures.  Pt having episodes of arrested speech consistent with brief focal seizures.  Intermittant confusion and word finding problems.  Pt lives by herself in a three level home with stairs.  Independent in ADLs and IADLS prior to admission.    Patient plan for discharge: Home or to a friend's house  Current status: Initial evaluation complete.  Pt scored at the mild neurocognitive level as measured by the SLUMS.  Upset at scores, did realize after her response that she had missed question.  Spent time providing reassurance about the test results and exploring plans after discharge.  Pt states she has had an offer to stay with friends in [place].  Would have her own room and bathroom in the basement of their house.  Provided suggestions for ongoing therapy suggesting possibilities for OP OT pending medical results.   Pt asked me to call her son with session results.  I followed through with call.  Pt did not have the number in her phone list but was able to remember his home phone number from memory.  Barriers to return to prior living situation: altered mental status  Recommendations for discharge: OP OT and supervision as needed from family  Rationale for recommendations: Pt moving well physically and has a sense of some of her cognitive deficits.  Plan to stay with family friend seems prudent and pt would benefit from occasional supervision for medication management and IADLS including financial management.  Pt is also hesitant to drive and would need assist for driving for appointments.  Pt could benefit from IP and OP OT to increase memory and problem solving skills for things like medication managment, IADL performance including financial management and possibly pre-driving skills.  Will provide additional resources for memory management and memory tips prior to discharge.       Entered by: Abran Morales  04/19/2020 4:31 PM

## 2020-04-19 NOTE — PLAN OF CARE
-diagnostic tests and consults completed and resulted  Yes  -vital signs normal or at patient baseline  Yes  -tolerating oral intake to maintain hydration  Yes  -returns to baseline functional status  Yes  -safe disposition plan has been identified  NA

## 2020-04-19 NOTE — PLAN OF CARE
A&Ox4 except forgetful.  VSS.  Afebrile.  Pt denies any SOB/coughing.  No c/o chest pain.  On IV ABX for UTI.  IV SL.  Up independently.  Tolerating regular diet.  COVID result-Negative.  Seen by Neuro today via pt's phone-Facetime.  Pt started on Keppra.  Pt to have EEG done.  Tele-NSR.

## 2020-04-19 NOTE — PROCEDURES
Procedure Date: 2020      PORTABLE ELECTROENCEPHALOGRAM      ORDERING PROVIDER:  Dr. Vazquez      This is a routine digital EEG done in the standard International 10-20 electrode system.  The awake recording shows mainly alpha frequency and is symmetric.  There is a posterior dominant rhythm of 9 Hz bilaterally with quiet wakefulness and eye closure.  The patient did fall asleep with normal POSTS, vertex waves, and K complexes.  Photic stimulation was performed, and no abnormalities occurred during the procedure.  No epileptiform discharges or seizures occurred during the recording.      IMPRESSION:  This is a normal routine EEG during the awake, drowsy and sleep states.  No epileptiform discharges or seizures occurred during the recording.         STEPHANIE VAZQUEZ MD             D: 2020   T: 2020   MT:       Name:     BECKY MONTANO   MRN:      [id number]        Account:        [id number]   :      1935           Procedure Date: 2020      Document: [id number]

## 2020-04-19 NOTE — PROGRESS NOTES
[facility]    Hospitalist Progress Note    Assessment & Plan   Kylah Carrasquillo is a pleasant 84 year old woman who presented to the ED today for evaluation of intermittent confusion over the past 5-6 days.  Current problems include:     Intermittent confusion/word-finding difficulty: suspect focal Seizure   Cannot rule out seizure disorder. Concern for complex partial SZ.   Nl bmp, cbc, troponins, tsh, ammonia, LFTs and UA, ucx growing bacteria but doubt uti- no urinary symptoms  CRP negative, procal negativek, lactate negative  bld cx ngtd  -b12, folate level normal   CXR negative:      MRI brain:                                                               IMPRESSION: Diffuse cerebral volume loss and cerebral white matter  changes consistent with chronic small vessel ischemic disease. No  evidence for acute intracranial pathology.    Discussed case with pt's son. Per son, pt has complained of being forgetful for last 2 weeks or so. Not remembering dates, recent events. On phone/face time family and friends note pt is forgetful not remembering having certain tests. Had a long pause in conversation with sister on Tuesday and with friends yesterday. Friends called son yesterday given pause in conversation and forgetfulness. He called pt and she had pause in conversation, phone seemed to be put down and he could hear her moving around.  He went over to see pt and found her without focal findings and laying in chair.     Nl neuro exam admission.   - nl vitals. Afebrile. Negative w/u thus far. Nl neuro exam  Treating for possible E coli UTI.   No symptoms.   No neuro sxs  ekg nl, nl telemetry  Covid testing negative. Pt taken out of isolation.   Plan:   -EEG this am - pending  --keppra 500mg bid per neurology for possible focal sz- 2 months of med filled.   -tele  - seizure precautions  - Neurology following  -thiamine 200mg iv X1 per neuro  -3 days Rocephin for possible uti  -OT cognitive eval  today  -not need PT  - per son, pt will be staying with friends at discharge (friends have been in isolation for over 2 weeks)       Recent diarrhea in setting of previous C. Diff. Infection; likely has resolved.  Tested + (for C. Diff) in 11/2019, but negative in 1/2020 and then earlier this month. Stool testing negative. 3 formed stools per day for last several days. Now eating fine.   No further diarrhea. Benign abd exam  - monitor for any recurrent Sx.  - covid testing negative  - seems resolved. No GI symptoms     Bacteriuria; no other urinary Sx.  - f/u UCx begun today; will give 3 day course of rocephin - third dose today     HTN; pressures high initially but subsequently in goal range  - resumed PTA amlodipine  - add PRN clonidine     Asthma, chronic and controlled.  - resume PTA Flovent/Spiriva and PRN albuterol MDI     Left hand discomfort/cramping; unclear cause.  Could be related to her previous Hx of Raynaud's.  - monitor        DVT Prophylaxis: Ambulate every shift and ad evens in room  Code Status: Full Code     Disposition: Expected discharge in 1-2 days.    Aaron Lopez MD  Text Page  (7am to 6pm)  Interval History   No events overnight. Nl tele  covid testing negative  No GI symptoms. Diarrhea resolved  eeg being set up    -Data reviewed today: I reviewed all new labs and imaging results over the last 24 hours. I personally reviewed imaging and labs since admission    Physical Exam   Temp: 98.2  F (36.8  C) Temp src: Oral BP: 130/68 Pulse: 64 Heart Rate: 60 Resp: 16 SpO2: 93 % O2 Device: None (Room air)    Vitals:    04/17/20 1330 04/17/20 2207   Weight: 54.4 kg (120 lb) 58.4 kg (128 lb 11.2 oz)     Vital Signs with Ranges  Temp:  [95.8  F (35.4  C)-98.2  F (36.8  C)] 98.2  F (36.8  C)  Pulse:  [64] 64  Heart Rate:  [60-77] 60  Resp:  [16] 16  BP: (112-135)/(52-79) 130/68  SpO2:  [93 %-99 %] 93 %  No intake/output data recorded.    Constitutional: Up in bed, nad,walking in  room  Respiratory: CTAB  Cardiovascular: RRR no r/g/m  GI: soft, nt, nd  Skin/Integumen: no rash or edema  Psych: nl affect  Neuro: nl gait, remembers events of hospital stay but not sure of diagnoses, CN 2-12 grossly intact, strength 5/5 extrem X 4, fully oriented, face symmetric, nl naming and speech.        Medications     - MEDICATION INSTRUCTIONS -         sodium chloride 0.9%  1,000 mL Intravenous Once     amLODIPine  2.5 mg Oral Daily     cefTRIAXone  1 g Intravenous Q24H     fluticasone  2 puff Inhalation BID     levETIRAcetam  500 mg Oral BID     umeclidinium  1 puff Inhalation Daily       Data   Recent Labs   Lab 04/17/20  1436   WBC 8.7   HGB 14.4   MCV 98         POTASSIUM 3.9   CHLORIDE 104   CO2 27   BUN 13   CR 0.65   ANIONGAP 6   ELVIRA 9.4   GLC 96   ALBUMIN 3.7   PROTTOTAL 7.4   BILITOTAL 0.3   ALKPHOS 55   ALT 18   AST 9   TROPI <0.015       Imaging:   No results found for this or any previous visit (from the past 24 hour(s)).

## 2020-04-20 VITALS
OXYGEN SATURATION: 95 % | HEIGHT: 62 IN | DIASTOLIC BLOOD PRESSURE: 69 MMHG | HEART RATE: 65 BPM | TEMPERATURE: 97.4 F | SYSTOLIC BLOOD PRESSURE: 127 MMHG | BODY MASS INDEX: 23.68 KG/M2 | WEIGHT: 128.7 LBS | RESPIRATION RATE: 16 BRPM

## 2020-04-20 LAB — INTERPRETATION ECG - MUSE: NORMAL

## 2020-04-20 PROCEDURE — 25000132 ZZH RX MED GY IP 250 OP 250 PS 637: Performed by: PHYSICIAN ASSISTANT

## 2020-04-20 PROCEDURE — 25000128 H RX IP 250 OP 636: Performed by: PHYSICIAN ASSISTANT

## 2020-04-20 PROCEDURE — 99238 HOSP IP/OBS DSCHRG MGMT 30/<: CPT | Performed by: INTERNAL MEDICINE

## 2020-04-20 RX ADMIN — CEFTRIAXONE 1 G: 1 INJECTION, POWDER, FOR SOLUTION INTRAMUSCULAR; INTRAVENOUS at 11:15

## 2020-04-20 RX ADMIN — FLUTICASONE PROPIONATE 2 PUFF: 110 AEROSOL, METERED RESPIRATORY (INHALATION) at 09:01

## 2020-04-20 RX ADMIN — UMECLIDINIUM 1 PUFF: 62.5 AEROSOL, POWDER ORAL at 09:01

## 2020-04-20 RX ADMIN — AMLODIPINE BESYLATE 2.5 MG: 2.5 TABLET ORAL at 08:59

## 2020-04-20 RX ADMIN — LEVETIRACETAM 500 MG: 500 TABLET, FILM COATED ORAL at 08:59

## 2020-04-20 ASSESSMENT — ACTIVITIES OF DAILY LIVING (ADL)
ADLS_ACUITY_SCORE: 9
ADLS_ACUITY_SCORE: 11
ADLS_ACUITY_SCORE: 9
ADLS_ACUITY_SCORE: 9

## 2020-04-20 NOTE — PLAN OF CARE
DATE & TIME: 4/19/2020 7-11pm shift    Cognitive Concerns/ Orientation : orientedx4- forgetful and talkative. Neuro intact - not q4hr checks.  BEHAVIOR & AGGRESSION TOOL COLOR: green  CIWA SCORE: N/A   ABNL VS/O2: VSS on RA  MOBILITY: up independent  PAIN MANAGMENT: denies pain  DIET: regular  BOWEL/BLADDER: continent to bathroom  ABNL LAB/BG: WDL  DRAIN/DEVICES: PIV is SL  TELEMETRY RHYTHM: NSR first degree AVB- consistent  SKIN: WDL  TESTS/PROCEDURES: none- continue keppra  D/C DAY/GOALS/PLACE: tomorrow  OTHER IMPORTANT INFO: none

## 2020-04-20 NOTE — PLAN OF CARE
Pt here with AMS, and possible focal seizures. A&O x 4. Neuros intact. VSS on RA. Tele NSR with 1st degree AVB. Regular diet, thin liquids. Takes pills whole with water. Up independently in room. Denies pain. Pt scoring green on the Aggression Stop Light Tool. Plan to discharge to home. Nursing will continue to monitor.

## 2020-04-20 NOTE — PROGRESS NOTES
CM    I: SW consult for discharge planning will be closed as therapies are recommending home. Pt reported to staff she will be staying with a friend who can assist w/any needs.    P: No SW interventions anticipated.    SIMÓN Jacobo   [facility]  [phone]

## 2020-04-20 NOTE — DISCHARGE SUMMARY
"[facility]    Discharge Summary  Hospitalist    Date of Admission:  4/17/2020  Date of Discharge:  4/20/2020  Discharging Provider: Aaron Lopez MD    Discharge Diagnoses   Recurrent focal seizure  Mild cognitive impairment- may be related to recent recurrent SZ    History of Present Illness   Per ED provider: Kylah Carrasquillo is a 84 year old year old woman who presents for evaluation of altered mental status with memory loss since Sunday.  History is per the patient and her son (over the phone).  The patient was slumped over today and she seemed even more confused per her son.  She is normally very sharp and intelligent, so this is very unusual for her. She has had diarrhea for 3 weeks and was recently tested negative for C-Diff  She had C-Diff and a \"parasite infection\" last August after a trip to [facility].  Diarrhea is now resolved.  She denies vomiting, blood in stools, fever/chills, cough, chest pain, SOB, runny nose.  Addendum: Kylah thinks her diarrhea resolved about a week ago.  No recent headache, falls or change in vision.  Has had at least 2 episodes (while talking with her son, and then with her sister) in past 5 days where she apparently just stopped talking, was not aware of this, and did not recall that it had happened.  She also mentions an uncomfortable cramping-type sensation in (mainly) her Left hand and possibly her right - intermittently, over the past month; has not noticed any discoloration or temperature changes when this is happening.    Hospital Course   Kylah Carrasquillo was admitted on 4/17/2020.  The following problems were addressed during her hospitalization:    Principal Problem:    Confusion  Active Problems:    Seizure (H)    Kylah Carrasquillo is a pleasant 84 year old woman who presented to the ED today for evaluation of intermittent confusion over the past 5-6 days.  Current problems include:     Intermittent confusion/word-finding difficulty: suspect " focal Seizure  -Discussed case with pt's son. Per son, pt has complained of being forgetful for last 2 weeks or so. Not remembering dates, recent events. On phone/face time family and friends note pt is forgetful not remembering having certain tests. Had a long pause in conversation with sister on Tuesday and with friends yesterday. Friends called son yesterday given pause in conversation and forgetfulness. He called pt and she had pause in conversation, phone seemed to be put down and he could hear her moving around.  He went over to see pt and found her without focal findings and laying in chair.   - Concern for complex partial SZ.   -Nl bmp, cbc, troponins, tsh, ammonia, LFTs and UA, ucx growing bacteria but doubt uti- no urinary symptoms  -CRP negative, procal negativek, lactate negative  bld cx ngtd  -b12, folate level normal   -CXR negative:      -ekg nl, nl telemetry during hospitalization    -MRI brain:                                                               IMPRESSION: Diffuse cerebral volume loss and cerebral white matter  changes consistent with chronic small vessel ischemic disease. No  evidence for acute intracranial pathology.     -EEG- normal  -Nl neuro exam admission.   - nl vitals. Afebrile. Negative w/u thus far. Nl neuro exam. Neuro eval remained nl during stay. No recurrent events  Treating for possible E coli UTI.   No symptoms, nl neuro exam during stay  Given thiamine 200mg IV X 1  Covid testing negative. Pt taken out of isolation.   EEG completed and normal    -Discharge dx recurrent focal seizures accounting for episodes of altered mental status, post ictal states at home likely account for cognitive impairment noted by pt and family.   Still with some mild cognitive impairment. ? Baseline or 2/2 recent recurrent focal SZ prehospital  -Pt initiated on Keppra 500mg bid and follow up with Dr. Vazquez in 4 weeks  -Seen by OT and mild cognitive impairment noted. rec'd outpatient OT  which has been ordered  - per son, pt will be staying with friends at discharge (friends have been in isolation for over 2 weeks)  -OT rec'd that pt have assistance with IADLs for now.   - follow up pcp 1 week, neurology appt 4 weeks.         Recent diarrhea in setting of previous C. Diff. Infection; likely has resolved.  Tested + (for C. Diff) in 11/2019, but negative in 1/2020 and then earlier this month. Stool testing negative. 3 formed stools per day for last several days. Now eating fine.   No further diarrhea. Benign abd exam  - monitor for any recurrent Sx.  - covid testing negative  -  resolved. No GI symptoms     Bacteriuria; no other urinary Sx.  -? UTI. Given recurrent focal SZ will treat for 4 days  - f/u UCx begun today; will give 3 day course of rocephin - will receive 4th dose of abx today prior to discharge to complete abx course       HTN; pressures high initially but subsequently in goal range for remainder of hospital stay  - resumed PTA amlodipine     Asthma, chronic and controlled.  - resume PTA Flovent/Spiriva and PRN albuterol MDI     Left hand discomfort/cramping; unclear cause.  Could be related to her previous Hx of Raynaud's.  - monitor  -follow up pcp 1 week        DVT Prophylaxis: Ambulate every shift and ad evens in room  Code Status: Full Code     Disposition: pt will discharge home with family friends (who have been complaint with giovanny for last 2 weeks) for added assistance and safety.   Outpatient OT    Discussed care plan with RN, pt and pt's son day of discharge    Aaron Lopez MD, MD    Significant Results and Procedures   See hospital course    Pending Results   These results will be followed up by hospitalist  Unresulted Labs Ordered in the Past 30 Days of this Admission     Date and Time Order Name Status Description    4/17/2020 1422 Blood culture Preliminary     4/17/2020 1422 Blood culture Preliminary           Code Status   Full Code       Primary Care Physician    Azucena Almanzar    Physical Exam   Temp: 97.4  F (36.3  C) Temp src: Oral BP: 123/64 Pulse: 65 Heart Rate: 82 Resp: 16 SpO2: 95 % O2 Device: None (Room air)    Vitals:    04/17/20 1330 04/17/20 2207   Weight: 54.4 kg (120 lb) 58.4 kg (128 lb 11.2 oz)     Vital Signs with Ranges  Temp:  [97.4  F (36.3  C)-98.3  F (36.8  C)] 97.4  F (36.3  C)  Pulse:  [65] 65  Heart Rate:  [71-82] 82  Resp:  [16] 16  BP: (121-133)/(57-70) 123/64  SpO2:  [91 %-96 %] 95 %  I/O last 3 completed shifts:  In: 240 [P.O.:240]  Out: -   Constitutional: in bed, nad,  Respiratory:     CTAB  Cardiovascular: RRR no r/g/m  GI: soft, nt, nd  Skin/Integumen: no rash or edema  Psych: nl affect  Neuro:  remembers events of hospital stay and dx of SZs,  CN 2-12 grossly intact, strength 5/5 extrem X 4, fully oriented, face symmetric, nl naming and speech.          Discharge Disposition   Discharged to home  Condition at discharge: Good    Consultations This Hospital Stay   NEUROLOGY IP CONSULT  SOCIAL WORK IP CONSULT  OCCUPATIONAL THERAPY ADULT IP CONSULT    Time Spent on this Encounter   I, Aaron Lopez MD, personally saw the patient today and spent less than or equal to 30 minutes discharging this patient.    Discharge Orders      Occupational Therapy Referral      Reason for your hospital stay    Suspect episodes of unresponsiveness secondary to focal seizure activity.     Activity    Your activity upon discharge: activity as tolerated  Recommend that you have assistance with medication management and complex daily activities such as paying bills, making appointments etc... for now     Discharge Instructions    Start antiseizure medication Keppra/Levetiracetam 500mg twice daily  No driving until you discuss this with dr. Carney your neurologist in 4 weeks     Follow-up and recommended labs and tests     -. Follow up with [facility], Dr Vazquez in 4 weeks- scheduled  - follow up with primary care provider 1  week to follow up hospitalization-scheduled  - outpatient occupational therapy in 1 week or so.  They will call you to schedule this     Full Code     Diet    Follow this diet upon discharge: Orders Placed This Encounter      Regular Diet Adult     Discharge Medications   Current Discharge Medication List      START taking these medications    Details   levETIRAcetam (KEPPRA) 500 MG tablet Take 1 tablet (500 mg) by mouth 2 times daily  Qty: 120 tablet, Refills: 0    Comments: Future refills by PCP Dr. Azucena Almanzar with phone number [phone].  Associated Diagnoses: Seizure disorder (H)         CONTINUE these medications which have NOT CHANGED    Details   albuterol (VENTOLIN HFA) 108 (90 Base) MCG/ACT inhaler Inhale 2 puffs into the lungs 4 times daily as needed  Qty: 2 Inhaler, Refills: 1    Comments: Pharmacy may dispense brand covered by insurance (Proair, or proventil or ventolin or generic albuterol inhaler)  Associated Diagnoses: Mild persistent asthma without complication      amLODIPine (NORVASC) 2.5 MG tablet Take 1 tablet (2.5 mg) by mouth daily  Qty: 90 tablet, Refills: 3    Associated Diagnoses: Raynaud's disease without gangrene      fluticasone (FLOVENT HFA) 110 MCG/ACT inhaler Inhale 2 puffs into the lungs 2 times daily  Qty: 3 Inhaler, Refills: 1    Associated Diagnoses: Mild persistent asthma without complication      multivitamin w/minerals (THERA-VIT-M) tablet Take 1 tablet by mouth daily      omega-3 acid ethyl esters (LOVAZA) 1 g capsule Take 1 g by mouth daily      tiotropium (SPIRIVA HANDIHALER) 18 MCG inhaled capsule INHALE  THE CONTENTS OF 1 CAPSULE ONCE DAILY  Qty: 90 capsule, Refills: 1    Associated Diagnoses: Mild persistent asthma without complication      Vitamin D, Cholecalciferol, 25 MCG (1000 UT) TABS Take 1,000 Units by mouth daily           Allergies   Allergies   Allergen Reactions     Benadryl [Diphenhydramine Hcl]      Morphine      Penicillins      Data   Most Recent  3 CBC's:  Recent Labs   Lab Test 04/17/20  1436 09/24/19  1343 11/24/14  1245   WBC 8.7 14.2* 7.8   HGB 14.4 12.2 14.2   MCV 98 96 97    365 310      Most Recent 3 BMP's:  Recent Labs   Lab Test 04/17/20  1436 11/05/19  0933 09/24/19  1343    140 139   POTASSIUM 3.9 4.0 3.6   CHLORIDE 104 109 110*   CO2 27 24 22   BUN 13 12 16   CR 0.65 0.82 0.98   ANIONGAP 6 7 7   ELVIRA 9.4 8.9 8.8   GLC 96 87 80     Most Recent 2 LFT's:  Recent Labs   Lab Test 04/17/20  1436 09/24/19  1343   AST 9 18   ALT 18 21   ALKPHOS 55 45   BILITOTAL 0.3 0.4     Most Recent INR's and Anticoagulation Dosing History:  Anticoagulation Dose History     There is no flowsheet data to display.        Most Recent 3 Troponin's:  Recent Labs   Lab Test 04/17/20  1436   TROPI <0.015     Most Recent Cholesterol Panel:  Recent Labs   Lab Test 10/22/18  1108   CHOL 233*   *   HDL 59   TRIG 111     Most Recent 6 Bacteria Isolates From Any Culture (See EPIC Reports for Culture Details):  Recent Labs   Lab Test 04/17/20  1537 04/17/20  1439   CULT No growth after 3 days No growth after 3 days  >100,000 colonies/mL  Escherichia coli  *     Most Recent TSH, T4 and A1c Labs:  Recent Labs   Lab Test 04/18/20  1005   TSH 2.03     Results for orders placed or performed during the hospital encounter of 04/17/20   XR Chest Port 1 View    Narrative    CHEST ONE VIEW  4/17/2020 3:00 PM     HISTORY: Confusion.    COMPARISON: February 15, 2016      Impression    IMPRESSION: No acute disease.    SHAYY LOWERY MD   MR Brain w/o Contrast    Narrative    MRI OF THE BRAIN WITHOUT CONTRAST 4/17/2020 6:38 PM     COMPARISON: None.    HISTORY: Confusion     TECHNIQUE:   Brain: Axial diffusion-weighted with ADC map, T2-weighted with fat  saturation, T1-weighted and turboFLAIR and sagittal T1-weighted images  of the brain were obtained without intravenous contrast.     FINDINGS: There is moderate diffuse cerebral volume loss. There are  numerous scattered focal  and extensive confluent periventricular areas  of abnormal T2 signal hyperintensity in the cerebral white matter  bilaterally that are consistent with sequela of chronic small vessel  ischemic disease.    The ventricles and basal cisterns are within normal limits in  configuration given the degree of cerebral volume loss. There is no  midline shift. There are no extra-axial fluid collections.  There is  no evidence for stroke or acute intracranial hemorrhage.    There is no sinusitis or mastoiditis.      Impression    IMPRESSION: Diffuse cerebral volume loss and cerebral white matter  changes consistent with chronic small vessel ischemic disease. No  evidence for acute intracranial pathology.    KESHA HUITRON MD

## 2020-04-20 NOTE — PLAN OF CARE
Pt alert and oriented x4. VSS on RA. Tele SR with PACs and a 1st degree AVB. On szr precautions, no witnessed or reported szr activity. Neuros intact. CMS intact. Denies pain. Tolerating regular diet. Ambulating independently. Reviewed written discharge instructions with pt and her son including new medications, szr education and follow-up appointments, all questions answered. Pt d/myesha to a friend's house with son providing transportation.

## 2020-04-20 NOTE — PLAN OF CARE
Occupational Therapy Discharge Summary    Reason for therapy discharge:    Discharged to home with outpatient therapy.    Progress towards therapy goal(s). See goals on Care Plan in [facility] electronic health record for goal details.  Goals not met.  Barriers to achieving goals:   discharge from facility.    Therapy recommendation(s):    Continued therapy is recommended.  Rationale/Recommendations:  home with friends to A with IADL's until further OP OT for cognitive assessment and treatment. A with med mgmt, shopping, transportation, cooking, etc.

## 2020-04-20 NOTE — PLAN OF CARE
A&OX4, VSS on RA. Denies pain. Up independently. Neuros intact except for forgetfulness. Seen by Neuro. EEG normal but neuro suspects focal seizure consistent with speech arrest. OT following. Possible discharge home tomorrow. IV Sled. Regular diet. Tele was SR with 1st degree AV block. Pt is now inpatient.Continue to monitor.

## 2020-04-21 ENCOUNTER — TELEPHONE (OUTPATIENT)
Dept: FAMILY MEDICINE | Facility: CLINIC | Age: 85
End: 2020-04-21

## 2020-04-21 NOTE — TELEPHONE ENCOUNTER
"Hospital/TCU/ED for chronic condition Discharge Protocol    \"Hi, my name is Lorelei Paniagua RN, a registered nurse, and I am calling from [facility].  I am calling to follow up and see how things are going for you after your recent emergency visit/hospital/TCU stay.\"    Tell me how you are doing now that you are home?\" I'am doing pretty good and am being taken care of by my friends.    Discharge Instructions    \"Let's review your discharge instructions.  What is/are the follow-up recommendations?  Pt. Response: f/u with Dr. Almanzar for a phone visit on 4/27 at 11 am.    \"Has an appointment with your primary care provider been scheduled?\"   Yes. (confirm)    \"When you see the provider, I would recommend that you bring your medications with you.\"    Medications    \"Tell me what changed about your medicines when you discharged?\"    Changes to chronic meds?    0-1    \"What questions do you have about your medications?\"    None     New diagnoses of heart failure, COPD, diabetes, or MI?    No              Post Discharge Medication Reconciliation Status: discharge medications reconciled, continue medications without change.    Was MTM referral placed (*Make sure to put transitions as reason for referral)?   No    Call Summary    \"What questions or concerns do you have about your recent visit and your follow-up care?\"     none    \"If you have questions or things don't continue to improve, we encourage you contact us through the main clinic number (give number).  Even if the clinic is not open, triage nurses are available 24/7 to help you.     We would like you to know that our clinic has extended hours (provide information).  We also have urgent care (provide details on closest location and hours/contact info)\"      \"Thank you for your time and take care!\"             "

## 2020-04-21 NOTE — TELEPHONE ENCOUNTER
She is scheduled for a telephone visit on Monday 4/27 for hospital follow up.  Just wondering what to use for sleep in the meantime?    Lorelei WILDER RN  EP Triage

## 2020-04-21 NOTE — TELEPHONE ENCOUNTER
Called patient and Cherise answered the phone patients friend. Patient gave permission for RN to inform her friend Cherise of MD response below. Patient and Cherise verbalized understanding and agrees with plan.     Anna Atkinson RN, BSN  [facility] [place]

## 2020-04-21 NOTE — TELEPHONE ENCOUNTER
Called patient and friend Cherise on was on the phone with patient listening. Informed patient on MD response below. Patient verbalized understanding and agrees with plan.     Anna Atkinson RN, BSN  [facility] [place]

## 2020-04-21 NOTE — TELEPHONE ENCOUNTER
Patient was discharged from [facility] on 04/20/20 after being treated for Altered Mental Status, Unspecified Altered Mental Status Type, Seizure Disorder (H). Triage please follow up with patient.      .Bessie GAN    [facility] [place]

## 2020-04-21 NOTE — TELEPHONE ENCOUNTER
Reason for Call:  Other call back    Detailed comments: Pt son is calling in to ask the dosage of melatonin she can take. Pt was in the hospital and it was given to her, but he did not know the dosage he could give her    Phone Number Patient can be reached at: Cell number on file:    Telephone Information:   Mobile [phone]       Best Time:     Can we leave a detailed message on this number? YES    Call taken on 4/21/2020 at 3:32 PM by Fiona Aragon

## 2020-04-21 NOTE — TELEPHONE ENCOUNTER
Pt did state she was having trouble falling and staying asleep which started before the hospitalization.  States she goes to bed late and is waking up early morning hours.  Wondering if this could be related to her keppra?  States she took the medication at 9 am and 9 pm yesterday.  Today took the med at 8:30 am and will take at 8:30 pm.    Wondering if could get something to help her sleep?    Pharmacy pended.    Pt can be reached at [phone].    Lorelei WILDER RN  EP Triage

## 2020-04-23 LAB
BACTERIA SPEC CULT: NO GROWTH
BACTERIA SPEC CULT: NO GROWTH
SPECIMEN SOURCE: NORMAL
SPECIMEN SOURCE: NORMAL

## 2020-04-27 ENCOUNTER — VIRTUAL VISIT (OUTPATIENT)
Dept: FAMILY MEDICINE | Facility: CLINIC | Age: 85
End: 2020-04-27
Payer: COMMERCIAL

## 2020-04-27 DIAGNOSIS — N39.0 URINARY TRACT INFECTION WITHOUT HEMATURIA, SITE UNSPECIFIED: ICD-10-CM

## 2020-04-27 DIAGNOSIS — R41.0 CONFUSION: ICD-10-CM

## 2020-04-27 DIAGNOSIS — Z09 HOSPITAL DISCHARGE FOLLOW-UP: Primary | ICD-10-CM

## 2020-04-27 DIAGNOSIS — R56.9 SEIZURE (H): ICD-10-CM

## 2020-04-27 DIAGNOSIS — R19.7 DIARRHEA, UNSPECIFIED TYPE: ICD-10-CM

## 2020-04-27 DIAGNOSIS — R25.2 HAND CRAMP: ICD-10-CM

## 2020-04-27 PROCEDURE — 99213 OFFICE O/P EST LOW 20 MIN: CPT | Mod: 95 | Performed by: FAMILY MEDICINE

## 2020-04-27 NOTE — PROGRESS NOTES
"Kylah Carrasquillo is a 84 year old female who is being evaluated via a billable telephone visit.      The patient has been notified of following:     \"This telephone visit will be conducted via a call between you and your physician/provider. We have found that certain health care needs can be provided without the need for a physical exam.  This service lets us provide the care you need with a short phone conversation.  If a prescription is necessary we can send it directly to your pharmacy.  If lab work is needed we can place an order for that and you can then stop by our lab to have the test done at a later time.    Telephone visits are billed at different rates depending on your insurance coverage. During this emergency period, for some insurers they may be billed the same as an in-person visit.  Please reach out to your insurance provider with any questions.    If during the course of the call the physician/provider feels a telephone visit is not appropriate, you will not be charged for this service.\"    Patient has given verbal consent for Telephone visit?  Yes    How would you like to obtain your AVS? [place]    Subjective     Kylah Carrasquillo is a 84 year old female who presents to clinic today for the following health issues:    [facility]    Hospital Follow-up Visit:    Hospital/Nursing Home/IP Rehab Facility: [facility]  Date of Admission: 4/17/2020  Date of Discharge: 4/20/2020  Reason(s) for Admission: Focal seizures      Was your hospitalization related to COVID-19? No   Problems taking medications regularly:  None  Medication changes since discharge: Keppra  Problems adhering to non-medication therapy:  None    Summary of hospitalization:  [facility] discharge summary reviewed  Diagnostic Tests/Treatments reviewed.  Follow up needed: with neurology - 5 times possible\" blank look\" seizure  ? First couple of days after discharge  - it has stopped now , somewhat forgetful   Other " Healthcare Providers Involved in Patient s Care:        neurology follow is scheduled   Update since discharge: improved.   Post Discharge Medication Reconciliation: discharge medications reconciled, continue medications without change.  Plan of care communicated with patient                   PROBLEMS TO ADD ON...  Patient had diarrhea which was seen before the hospitalization.  Although it has resolved while she was still in the hospital.  Per patient's daughter she had 1day after discharge which she noted little bit of loose bowel movement for a day but did not resolve quickly and has had no recurrence.  She is eating well.  No nausea vomiting any abdominal pain etc..  She also was treated for UTI finished her antibiotics.  There is no current concerning any urinary symptoms . Urinating normal.      Hypertension controlled: Yes, but not checking , although daughter will check to make sure it is ok     But she thinks she feels fine.  Denies any chest pain palpitation shortness of breath etc..   seizure  Follow-up      Patient history: She was hospitalized for episode of altered mentation/confusion episode which was thought to be possible focal seizure.  She is on.  Now and she has no problem taking the medication.     Residual symptoms: None -per daughter she has noted some weakness in the arm on the right and , like she is dropping things easily and not holding a strong , although not sure if it is just her generalized weakness or something to do with the neurological problem .  Per patient she really cannot tell a difference herself /  but she is rt hand dominant, so it could be generalized weakness .    She had issues with some left hand twitches and cramping when she was in the hospital.  That has resolved.  Per daughter she might have noted some twitching couple times since after the discharge, but not a frequent problem anymore.  She denies any pain in her hands.     She thinks she is somewhat forgetful, may  be a little bit more since the discharge.  Although she also went to stay at her take home for few days and now back to her regular place, so she is not sure that some of the disorientation could very well be from all the change.  Daughter and patient thinks ,  that mentally she is otherwise doing okay.     Worsened or new symptoms since last visit: No              Patient Active Problem List   Diagnosis     Mild intermittent asthma     Cardiovascular disease     Raynaud's syndrome     Advanced directives, counseling/discussion     Osteoporosis     Hyperlipidemia LDL goal <100     First degree AV block     Cataracta     Hallux valgus, acquired     Arthritis of right foot     S/P hysterectomy     Overweight (BMI 25.0-29.9)     Confusion     Seizure (H)     Past Surgical History:   Procedure Laterality Date     CYSTOCELE REPAIR       HEMORRHOIDECTOMY       HERNIA REPAIR, UMBILICAL       HYSTERECTOMY, VAGINAL  1988    menorrhagia     PHACOEMULSIFICATION CLEAR CORNEA WITH STANDARD INTRAOCULAR LENS IMPLANT  2014    Procedure: PHACOEMULSIFICATION CLEAR CORNEA WITH STANDARD INTRAOCULAR LENS IMPLANT;  Surgeon: Austin Garcia MD;  Location:  [facility]     PHACOEMULSIFICATION CLEAR CORNEA WITH STANDARD INTRAOCULAR LENS IMPLANT Left 2014    Procedure: PHACOEMULSIFICATION CLEAR CORNEA WITH STANDARD INTRAOCULAR LENS IMPLANT;  Surgeon: Austin Garcia MD;  Location:  [facility]     TONSILLECTOMY       TUBAL LIGATION         Social History     Tobacco Use     Smoking status: Former Smoker     Last attempt to quit: 11/15/1990     Years since quittin.4     Smokeless tobacco: Never Used   Substance Use Topics     Alcohol use: Yes     Alcohol/week: 0.0 standard drinks     Comment: occassional     Family History   Problem Relation Age of Onset     Asthma Mother      Osteoporosis Mother      KAMI Father      Diabetes Daughter      Thyroid Disease Daughter      KAMI Brother      KAMI  Brother      Thyroid Disease Sister      Diabetes Brother      Hypertension No family hx of      Breast Cancer No family hx of      Cancer - colorectal No family hx of      Anesthesia Reaction No family hx of      Blood Disease No family hx of      Eye Disorder No family hx of            Reviewed and updated as needed this visit by Provider         Review of Systems   ROS COMP: Constitutional, HEENT, cardiovascular, pulmonary, GI, , musculoskeletal, neuro, skin, endocrine and psych systems are negative, except as otherwise noted.             Assessment/Plan:    (Z09) Hospital discharge follow-up  (primary encounter diagnosis)  Comment:   Plan:     (R56.9) Seizure (H)  Comment: had few possible seizure  activity ( blank stare) few times first couple of days after discharge but none since   Plan: stable . She plans to do follow up visit with neurology as scheduled but may do sooner if any concerning sx or problem     (R41.0) Confusion  Comment:   Plan: improved     (R25.2) Hand cramp  Comment: left- improved , has may be weakness rt hand, unsure if it generalizes weakness or possible neuro cause ? Which I doubt  Plan: she will discuss that with neurology for follow     (R19.7) Diarrhea, unspecified type  Comment: resolved completely  Plan: diet/ hydration discussed     (N39.0) Urinary tract infection without hematuria, site unspecified  Comment: was treated during hospitalization- resolved   Plan:       Cares and  treatment discussed. follow up if problem   Patient and her daughter expressed understanding and agreement with treatment plan. All patient's and her daughter's questions were answered, will let me know if has more later.  Medications: Rx's: Reviewed the potential side effects/complications of medications prescribed.       Phone call duration:  20  minutes    Azucena Almanzar MD

## 2020-04-29 ENCOUNTER — HOSPITAL ENCOUNTER (OUTPATIENT)
Dept: OCCUPATIONAL THERAPY | Facility: CLINIC | Age: 85
Setting detail: THERAPIES SERIES
End: 2020-04-29
Attending: INTERNAL MEDICINE
Payer: COMMERCIAL

## 2020-04-29 PROCEDURE — 97166 OT EVAL MOD COMPLEX 45 MIN: CPT | Mod: GO | Performed by: REHABILITATION PRACTITIONER

## 2020-04-29 NOTE — PROGRESS NOTES
04/29/20 0900   Quick Adds   Type of Visit Outpatient Occupational Therapy Re-Evaluation       Present No   General Information   Start Of Care Date 04/29/20   Referring Physician Dr. Aaron Lopez   Orders Evaluate and treat as indicated   Orders Date 04/20/20   Medical Diagnosis Recurrent Focal Seizures   Onset of Illness/Injury or Date of Surgery 04/17/20   Precautions/Limitations Seizure precautions   Special Instructions Cognitive Assessment, Cognitive impairment recent diagnosis of focal seizures   Surgical/Medical History Reviewed Yes   Additional Occupational Profile Info/Pertinent History of Current Problem Pt had multiple witnessed seizures where she stopped talking and was not aware.  Pt was admitted to [facility] 4/17-4/20/20 for seizure activity.  Pt returned from [place] March 15th for 6 weeks and [place] 2 weeks before then.     Comments/Observations MRI shows: Diffuse cerebral volume loss and cerebral white matter changes   Role/Living Environment   Current Community Support Family/friend caregiver   Patient role/Employment history Retired   Community/Avocational Activities Enjoys reading, painting, crochette, visit with friends.  Pt reports she has   Current Living Environment House   Home/Community Accessibility Comments stairs within home, 3 levels from garage   Prior Level - Transfers Independent   Prior Level - Ambulation Independent   Prior Level - ADLS Independent   Prior Responsibilities - IADL Housekeeping;Meal Preparation;Laundry;Shopping;Yardwork;Medication management;Driving;Finances   Role/Living Environment Comments Pt is currently living with daughter due to confusion.  Daughter reports pt is forgetting conversations, leaving water on, forgetting what she has done, word finding, difficulty sleeping, weakness, dropping items and twitch in hands.  Pt is on keppra for seizures.     Patient/family Goals Statement Be able to return home, improve memory, return to  "driving.   Pain   Patient currently in pain No   Fall Risk Screen   Fall screen completed by OT   Cognitive Status Examination   Orientation Orientation to person, place and time   Level of Consciousness Alert   Follows Commands and Answers Questions 50% of the time   Personal Safety and Judgment Impaired;At risk behaviors demonstrated   Memory Impaired   Memory Comments Pt reports she was not having any memory issues prior to seizure activity   Attention Selective attention impaired, difficulty ignoring irrelevant stimuli   Organization/Problem Solving Problem solving impaired   Executive Function Working memory impaired, decreased storage of information for performing tasks;Cognitive flexibility impaired;Self awareness/monitoring impaired   Cognitive Comment Pt has awarness of impaired memory, no awareness of limitations in attention.  Pt will benefit from further cognitive assessment to determine impact on ADL/IADL and CPT testing.  MOCA score was 20/30 (26+ WNL) indicating moderate impairment in cognition.  Impairments noted in executive function, recall 2/5, attention, language fluency and insight into deficits.  Recommend SLP referral for language impairments.     Visual Perception   Visual Perception Wears glasses   Hand Strength   Hand Dominance Right   Left Hand  (pounds) 24 pounds   Right Hand  (pounds) 26 pounds   Hand Strength Comments  strength below average   Coordination   Functional Limitations Fine motor ADL performance impaired  (Pt dropped pen in clinic, daughter reports increas frequency)   Coordination Comments Daughter reports \"twitch\" not observed during session.     Activity Tolerance   Activity Tolerance Pt is independent in basic ADL tasks, reports mild fatigue   Instrumental Activities of Daily Living Assessment   IADL Assessment/Observations Pt is getting assist with all IADL tasks    Planned Therapy Interventions   Planned Therapy Interventions IADL training;ADL " training;Cognitive performance testing;Community/work reintegration;Self care/Home management;Strengthening   Adult OT Eval Goals   OT Eval Goals (Adult) 1;2;3;4;5;6;7    OT Goal 1   Goal Identifier 1 Daily schedule   Goal Description Pt will demonstrate Mod I with managing daily schedule and recall for appointments using 1-2 memory strategies consistently 7/7 days per pt report and demonstrated by independent use of memory binder.   Target Date 07/22/20    OT Goal 2   Goal Identifier 2 HEP   Goal Description Pt will complete coordination HEP 5/7 days for 10-15 min with mod I to increase strength and coordination for ADL tasks as measured through improved  strength and  9 hole peg test and improved legibility with writing per pt report.   Target Date 07/22/20    OT Goal 3   Goal Identifier 3 Medication management   Goal Description Pt will complete medication management task with 4+ novel medications and 100% accuracy and use of 2 strategies for organization.   Target Date 07/22/20   OT Goal 4   Goal Identifier 4 CPT   Goal Description Pt and family will participate in CPT, verbalize understanding of results and recommendations for safety.     Target Date 07/22/20   OT Goal 5   Goal Identifier 5 meal prep   Goal Description Pt will complete 2 dish meal prep task with mod I safely with 100% accuracy following written directions.    Target Date 07/22/20    OT Goal 6   Goal Identifier 6 numerical reasoning   Goal Description Pt will demonstrate 90% accuracy on complex financial management task with use of 1-2 strategies to improve organization with mod I.     Target Date 07/22/20   OT Goal 7   Goal Identifier 7 Community reintegration   Goal Description Pt will demonstrate 90% accuracy on complex calendar management task with use of 1-2 strategies to improve organization with mod I.     Target Date 07/22/20   Clinical Impression   Criteria for Skilled Therapeutic Interventions Met Yes, treatment indicated   OT  Diagnosis Impaired ADL/IADL I   Influenced by the following impairments Impaired attention, recall, insight into deficits impacting ADL/IADL I and safety   Assessment of Occupational Performance 5 or more Performance Deficits   Identified Performance Deficits Meal prep, managing daily schedule, medication management, financial management, community reintegration, safety within home   Clinical Decision Making (Complexity) Moderate complexity   Therapy Frequency 2x/week   Predicted Duration of Therapy Intervention (days/wks) 8 weeks   Risks and Benefits of Treatment have been explained. Yes   Patient, Family & other staff in agreement with plan of care Yes   Clinical Impression Comments Pt demonstrates clinical need for skilled OT services to improve ADL/IADL independence and safety.  Pt is motivated to improve and has good family support.  Pt's cognitive decline is acute and result of seizures. Anticipate pt will be able to make improvements in cognitive performance, ADL's and IADL's with skilled OT intervention.     Education Assessment   Barriers To Learning Cognitive   Preferred Learning Style Demonstration;Reading   Total Evaluation Time   OT Abi, Moderate Complexity Minutes (25875) 40

## 2020-05-06 ENCOUNTER — HOSPITAL ENCOUNTER (OUTPATIENT)
Dept: OCCUPATIONAL THERAPY | Facility: CLINIC | Age: 85
Setting detail: THERAPIES SERIES
End: 2020-05-06
Attending: INTERNAL MEDICINE
Payer: COMMERCIAL

## 2020-05-06 PROCEDURE — 97535 SELF CARE MNGMENT TRAINING: CPT | Mod: GO | Performed by: OCCUPATIONAL THERAPIST

## 2020-05-06 PROCEDURE — 97110 THERAPEUTIC EXERCISES: CPT | Mod: GO | Performed by: OCCUPATIONAL THERAPIST

## 2020-05-08 ENCOUNTER — HOSPITAL ENCOUNTER (OUTPATIENT)
Dept: OCCUPATIONAL THERAPY | Facility: CLINIC | Age: 85
Setting detail: THERAPIES SERIES
End: 2020-05-08
Attending: INTERNAL MEDICINE
Payer: COMMERCIAL

## 2020-05-08 PROCEDURE — 97535 SELF CARE MNGMENT TRAINING: CPT | Mod: GO | Performed by: OCCUPATIONAL THERAPIST

## 2020-05-08 PROCEDURE — 96125 COGNITIVE TEST BY HC PRO: CPT | Mod: GO | Performed by: OCCUPATIONAL THERAPIST

## 2020-05-08 NOTE — PROGRESS NOTES
Cognitive Performance Test    SUMMARY OF TEST:    The Cognitive Performance Test (CPT) is a standardized performance-based assessment to measure working memory/executive function processing capacities that underlie functional performance. Subtasks include common basic and instrumental activities of daily living (ADL/IADL) which are rated based on the manner in which patients respond to task demands of varying complexity. The total CPT score describes a level of functioning that indicates how information is processed, implications for functional activities, potential safety risks and a recommended level of supervision or assist based on cognitive function. The highest total score on this test is in the range of 5.6 to 5.8.    DATE OF TESTIN20    RESULTS OF TESTING:                                                                                         CPT Subtest Results    MEDBOX: 4. SHOP/GLOVES:  PHONE:    WASH:   TRAVEL:  TOAST:    DRESS:    TOTAL CPT SCORE:  4.9/5.6     Average CPT Score  4.9/5.6    INTERPRETATION OF TEST RESULTS:    Based on the Cognitive Performance Test, this patient scored at CPT Level 4.5.  See CPT Levels reference below.    Summary of functional cognitive status:   Patient able to attend to all tasks today, slightly distracted with one task but able to be redirected.  She set up 2 of 4 medications correctly, able to correct one with specific cue.  Patient/family reports inconsistencies with her memory and irregular sleep patterns.    Factors affecting performance:  Other:  Fatigue    Recommendations:    Assist for ADL/IADL:  Meal preparation, Finances and Driving  Supervision for ADL/IADL:  Laundry, Shopping and Medication management  Supervision in living setting:  Daily checks    Recommend continued OP OT for education in memory compensatory strategies as well as safety with IADLs and community mobility.  Will continue 2x/week.                                    "                    TIME ADMINISTERING TEST: 35    TIME FOR INTERPRETATION AND PREPARATION OF REPORT: 10    TOTAL TIME: 45      CPT Levels Reference:    Patient's Average CPT Score:  4.5                                                                                                                                                  Individual scores range along a continuum as outlined below.  In addition to cognitive status, other factors may affect safety in a home environment.  Please refer to specific recommendations for this patient.    ___5.6-5.8  Normal functioning (absence of cognitive-functional disability).  Independent in managing personal affairs, monitors and directs own behavior.  Uses complex information to carry out daily activities with safety and accuracy.    Proficient with instrumental activities of daily living (IADL) and learning new activity.  Problems are anticipated, errors are avoided, and consequences of actions are considered.      ___5.0   Mild cognitive-functional disability; deficits in working memory and executive thought processes. Difficulty using complex information. Problems may be observed with recent memory, judgment, reasoning and planning ahead. May be impulsive or have difficulty anticipating consequences.  Safety:  May require assistance to plan ahead; or to manage complex medication schedules, appointments or finances.  Hazardous activities may need to be monitored or limited.  ADL:  Mild functional decline.  Able to complete basic self-care and routine household tasks.  May have difficulty with complex daily tasks such as reading, writing, meal preparation, shopping or driving.   Learns through hands on teaching. Self-centered behavior or difficulty considering the needs of others may be seen related to trouble seeing the  whole picture\". Can appear disorganized or uninhibited.    _X__4.5  Mild to moderate cognitive-functional disability. Significant deficits in working " memory and executive thought processes. Judgment, reasoning and planning show obvious impairment.  Distractible with inability to shift attention/actions given competing stimuli.  Difficulty with problem solving and managing details. Complex daily tasks performed with inconsistency, difficulty, or error.     Safety:  Medications should be monitored, stove use may require supervision, and driving ability may be affected.  Impaired safety awareness with inability to anticipate potential problems.  May not recognize or respond to emergent situations. Requires frequent check-in support.   ADL:  Mild difficulty with simple everyday self-care tasks. Benefits from structured, routine activity.  Will likely need reminders to complete tasks outside of the routine. Requires assistance with planning and IADL tasks like shopping and finances. Learns concrete tasks through repetition, but performance may not generalize. Tends to be impulsive with poor insight. Self centered behavior or inability to consider the needs of others is common.    ___4.0  Moderate cognitive-functional disability; abstract to concrete thought processes. Working memory and executive function impairments are obvious. Difficulty with planning and problem solving.  Behavior is goal-directed, but unable to follow multi-step directions, is easily distracted, and may not recognize mistakes.  Inability to anticipate hazards or understand precautions.  Safety:  Recommend 24-hour supervision for safety. Supervision needed for medication management and for hazardous activities. May not be able to follow a restricted diet. Can get lost in unfamiliar surroundings. Generally, persons functioning at level 4 should not be driving.   ADL:  Some decline in quality or frequency of ADL.  [place] enhanced by use of a routine, simple concrete directions, and caregiver set-up of needed items. Complex tasks such as money or home management typically requires assistance.   Relies heavily on vision to guide behavior; will ignore objects/hazards not in plain sight and can be distracted by irrelevant objects. Often has poor insight.  Able to carry out social conversation and may verbally  cover  for deficits leading caregivers to believe they are capable of functioning independently.       ___3.5  Moderate cognitive-functional disability; increased cues needed for task completion. Aware of concrete task steps but needs prompting or cues to initiate and complete simple tasks. Attention span is limited, simple directions may need to be repeated, and re-focus to a topic or task may be required.  Safety:  24-hour supervision required for safety and for assistance with daily tasks. Assistance required with medications, and access to medication should be limited. Meals, nutrition and dietary restrictions need to be monitored.  All hazardous activities should be restricted or supervised. Should not drive. Prone to wandering and can become lost.  ADL:  Moderate functional decline. Familiar tasks usually requires set-up of supplies and directions to complete steps. May need objects handed to them for task initiation. Function best with a set schedule in familiar surroundings with familiar people. All complex tasks must be done by others. Vocabulary is diminished and speech often unfocused.

## 2020-05-11 DIAGNOSIS — R25.1 INVOLUNTARY QUIVER: ICD-10-CM

## 2020-05-11 DIAGNOSIS — R25.3 FASCICULATION: ICD-10-CM

## 2020-05-11 DIAGNOSIS — G40.109 SPECIAL SENSORY ATTACKS (H): Primary | ICD-10-CM

## 2020-05-11 DIAGNOSIS — R41.3 MEMORY LOSS: ICD-10-CM

## 2020-05-12 DIAGNOSIS — R25.1 INVOLUNTARY QUIVER: ICD-10-CM

## 2020-05-12 DIAGNOSIS — G40.109 SPECIAL SENSORY ATTACKS (H): ICD-10-CM

## 2020-05-12 DIAGNOSIS — R41.3 MEMORY LOSS: ICD-10-CM

## 2020-05-12 DIAGNOSIS — R25.3 FASCICULATION: ICD-10-CM

## 2020-05-12 LAB
ALBUMIN SERPL-MCNC: 3.8 G/DL (ref 3.4–5)
ALP SERPL-CCNC: 47 U/L (ref 40–150)
ALT SERPL W P-5'-P-CCNC: 24 U/L (ref 0–50)
AMMONIA PLAS-SCNC: 13 UMOL/L (ref 10–50)
ANION GAP SERPL CALCULATED.3IONS-SCNC: 5 MMOL/L (ref 3–14)
AST SERPL W P-5'-P-CCNC: 14 U/L (ref 0–45)
BILIRUB SERPL-MCNC: 0.3 MG/DL (ref 0.2–1.3)
BUN SERPL-MCNC: 19 MG/DL (ref 7–30)
CALCIUM SERPL-MCNC: 9.4 MG/DL (ref 8.5–10.1)
CHLORIDE SERPL-SCNC: 97 MMOL/L (ref 94–109)
CK SERPL-CCNC: 64 U/L (ref 30–225)
CO2 SERPL-SCNC: 27 MMOL/L (ref 20–32)
CREAT SERPL-MCNC: 0.84 MG/DL (ref 0.52–1.04)
ERYTHROCYTE [DISTWIDTH] IN BLOOD BY AUTOMATED COUNT: 12.6 % (ref 10–15)
GFR SERPL CREATININE-BSD FRML MDRD: 64 ML/MIN/{1.73_M2}
GLUCOSE SERPL-MCNC: 186 MG/DL (ref 70–99)
HCT VFR BLD AUTO: 41.9 % (ref 35–47)
HGB BLD-MCNC: 13.8 G/DL (ref 11.7–15.7)
MCH RBC QN AUTO: 31.9 PG (ref 26.5–33)
MCHC RBC AUTO-ENTMCNC: 32.9 G/DL (ref 31.5–36.5)
MCV RBC AUTO: 97 FL (ref 78–100)
PLATELET # BLD AUTO: 322 10E9/L (ref 150–450)
POTASSIUM SERPL-SCNC: 3.7 MMOL/L (ref 3.4–5.3)
PROT SERPL-MCNC: 7.2 G/DL (ref 6.8–8.8)
RBC # BLD AUTO: 4.33 10E12/L (ref 3.8–5.2)
SODIUM SERPL-SCNC: 129 MMOL/L (ref 133–144)
WBC # BLD AUTO: 8.9 10E9/L (ref 4–11)

## 2020-05-12 PROCEDURE — 80053 COMPREHEN METABOLIC PANEL: CPT | Performed by: PSYCHIATRY & NEUROLOGY

## 2020-05-12 PROCEDURE — 82550 ASSAY OF CK (CPK): CPT | Performed by: PSYCHIATRY & NEUROLOGY

## 2020-05-12 PROCEDURE — 80177 DRUG SCRN QUAN LEVETIRACETAM: CPT | Mod: 90 | Performed by: PSYCHIATRY & NEUROLOGY

## 2020-05-12 PROCEDURE — 99000 SPECIMEN HANDLING OFFICE-LAB: CPT | Performed by: PSYCHIATRY & NEUROLOGY

## 2020-05-12 PROCEDURE — 36415 COLL VENOUS BLD VENIPUNCTURE: CPT | Performed by: PSYCHIATRY & NEUROLOGY

## 2020-05-12 PROCEDURE — 82140 ASSAY OF AMMONIA: CPT | Performed by: PSYCHIATRY & NEUROLOGY

## 2020-05-12 PROCEDURE — 85027 COMPLETE CBC AUTOMATED: CPT | Performed by: PSYCHIATRY & NEUROLOGY

## 2020-05-13 ENCOUNTER — HOSPITAL ENCOUNTER (OUTPATIENT)
Dept: OCCUPATIONAL THERAPY | Facility: CLINIC | Age: 85
Setting detail: THERAPIES SERIES
End: 2020-05-13
Attending: INTERNAL MEDICINE
Payer: COMMERCIAL

## 2020-05-13 PROCEDURE — 97535 SELF CARE MNGMENT TRAINING: CPT | Mod: GO | Performed by: REHABILITATION PRACTITIONER

## 2020-05-14 ENCOUNTER — HOSPITAL ENCOUNTER (OUTPATIENT)
Dept: OCCUPATIONAL THERAPY | Facility: CLINIC | Age: 85
Setting detail: THERAPIES SERIES
End: 2020-05-14
Attending: INTERNAL MEDICINE
Payer: COMMERCIAL

## 2020-05-14 PROCEDURE — 97112 NEUROMUSCULAR REEDUCATION: CPT | Mod: GO | Performed by: REHABILITATION PRACTITIONER

## 2020-05-14 PROCEDURE — 97535 SELF CARE MNGMENT TRAINING: CPT | Mod: GO | Performed by: REHABILITATION PRACTITIONER

## 2020-05-19 ENCOUNTER — HOSPITAL ENCOUNTER (OUTPATIENT)
Dept: OCCUPATIONAL THERAPY | Facility: CLINIC | Age: 85
Setting detail: THERAPIES SERIES
End: 2020-05-19
Attending: INTERNAL MEDICINE
Payer: COMMERCIAL

## 2020-05-19 LAB — LAB SCANNED RESULT: NORMAL

## 2020-05-19 PROCEDURE — 97112 NEUROMUSCULAR REEDUCATION: CPT | Mod: GO | Performed by: OCCUPATIONAL THERAPIST

## 2020-05-19 PROCEDURE — 97535 SELF CARE MNGMENT TRAINING: CPT | Mod: GO | Performed by: OCCUPATIONAL THERAPIST

## 2020-05-20 ENCOUNTER — HOSPITAL ENCOUNTER (OUTPATIENT)
Dept: OCCUPATIONAL THERAPY | Facility: CLINIC | Age: 85
Setting detail: THERAPIES SERIES
End: 2020-05-20
Attending: INTERNAL MEDICINE
Payer: COMMERCIAL

## 2020-05-20 DIAGNOSIS — G40.109 SPECIAL SENSORY ATTACKS (H): ICD-10-CM

## 2020-05-20 DIAGNOSIS — E87.1 HYPOSMOLALITY SYNDROME: Primary | ICD-10-CM

## 2020-05-20 PROCEDURE — 97535 SELF CARE MNGMENT TRAINING: CPT | Mod: GO | Performed by: OCCUPATIONAL THERAPIST

## 2020-05-20 PROCEDURE — 97537 COMMUNITY/WORK REINTEGRATION: CPT | Mod: GO | Performed by: OCCUPATIONAL THERAPIST

## 2020-05-22 DIAGNOSIS — G40.109 SPECIAL SENSORY ATTACKS (H): ICD-10-CM

## 2020-05-22 DIAGNOSIS — E87.1 HYPOSMOLALITY SYNDROME: ICD-10-CM

## 2020-05-22 LAB
ALBUMIN SERPL-MCNC: 3.4 G/DL (ref 3.4–5)
ALP SERPL-CCNC: 43 U/L (ref 40–150)
ALT SERPL W P-5'-P-CCNC: 23 U/L (ref 0–50)
ANION GAP SERPL CALCULATED.3IONS-SCNC: 6 MMOL/L (ref 3–14)
AST SERPL W P-5'-P-CCNC: 11 U/L (ref 0–45)
BILIRUB SERPL-MCNC: 0.2 MG/DL (ref 0.2–1.3)
BUN SERPL-MCNC: 19 MG/DL (ref 7–30)
CALCIUM SERPL-MCNC: 9.2 MG/DL (ref 8.5–10.1)
CHLORIDE SERPL-SCNC: 105 MMOL/L (ref 94–109)
CO2 SERPL-SCNC: 24 MMOL/L (ref 20–32)
CREAT SERPL-MCNC: 0.72 MG/DL (ref 0.52–1.04)
GFR SERPL CREATININE-BSD FRML MDRD: 76 ML/MIN/{1.73_M2}
GLUCOSE SERPL-MCNC: 86 MG/DL (ref 70–99)
POTASSIUM SERPL-SCNC: 4 MMOL/L (ref 3.4–5.3)
PROT SERPL-MCNC: 6.7 G/DL (ref 6.8–8.8)
SODIUM SERPL-SCNC: 135 MMOL/L (ref 133–144)

## 2020-05-22 PROCEDURE — 36415 COLL VENOUS BLD VENIPUNCTURE: CPT | Performed by: PSYCHIATRY & NEUROLOGY

## 2020-05-22 PROCEDURE — 80053 COMPREHEN METABOLIC PANEL: CPT | Performed by: PSYCHIATRY & NEUROLOGY

## 2020-05-26 DIAGNOSIS — R25.1 SHAKES: ICD-10-CM

## 2020-05-26 DIAGNOSIS — R41.3 MEMORY LOSS: ICD-10-CM

## 2020-05-26 DIAGNOSIS — R25.3 MUSCLE TWITCH: ICD-10-CM

## 2020-05-26 DIAGNOSIS — N39.0 URINARY TRACT INFECTION WITHOUT HEMATURIA, SITE UNSPECIFIED: ICD-10-CM

## 2020-05-26 DIAGNOSIS — G40.109 SPECIAL SENSORY ATTACKS (H): Primary | ICD-10-CM

## 2020-05-27 ENCOUNTER — HOSPITAL ENCOUNTER (OUTPATIENT)
Dept: OCCUPATIONAL THERAPY | Facility: CLINIC | Age: 85
Setting detail: THERAPIES SERIES
End: 2020-05-27
Attending: INTERNAL MEDICINE
Payer: COMMERCIAL

## 2020-05-27 PROCEDURE — 97537 COMMUNITY/WORK REINTEGRATION: CPT | Mod: GO | Performed by: REHABILITATION PRACTITIONER

## 2020-05-27 PROCEDURE — 97535 SELF CARE MNGMENT TRAINING: CPT | Mod: GO | Performed by: REHABILITATION PRACTITIONER

## 2020-05-28 ENCOUNTER — VIRTUAL VISIT (OUTPATIENT)
Dept: FAMILY MEDICINE | Facility: CLINIC | Age: 85
End: 2020-05-28
Payer: COMMERCIAL

## 2020-05-28 ENCOUNTER — HOSPITAL ENCOUNTER (OUTPATIENT)
Dept: OCCUPATIONAL THERAPY | Facility: CLINIC | Age: 85
Setting detail: THERAPIES SERIES
End: 2020-05-28
Attending: INTERNAL MEDICINE
Payer: COMMERCIAL

## 2020-05-28 DIAGNOSIS — R53.83 FATIGUE, UNSPECIFIED TYPE: ICD-10-CM

## 2020-05-28 DIAGNOSIS — F43.23 ADJUSTMENT DISORDER WITH MIXED ANXIETY AND DEPRESSED MOOD: Primary | ICD-10-CM

## 2020-05-28 DIAGNOSIS — R19.7 DIARRHEA, UNSPECIFIED TYPE: ICD-10-CM

## 2020-05-28 PROCEDURE — 99214 OFFICE O/P EST MOD 30 MIN: CPT | Mod: 95 | Performed by: FAMILY MEDICINE

## 2020-05-28 PROCEDURE — 97535 SELF CARE MNGMENT TRAINING: CPT | Mod: GO | Performed by: REHABILITATION PRACTITIONER

## 2020-05-28 RX ORDER — MULTIVITAMIN WITH IRON
1 TABLET ORAL DAILY
COMMUNITY

## 2020-05-28 RX ORDER — PREGABALIN 25 MG/1
CAPSULE ORAL
COMMUNITY
Start: 2020-05-26 | End: 2020-08-13

## 2020-05-28 NOTE — PATIENT INSTRUCTIONS
Patient Education     Your Body s Response to Anxiety    Normal anxiety is part of the body s natural defense system. It's an alert to a threat that is unknown, vague, or comes from your own internal fears. While you re in this state, your feelings can range from a vague sense of worry to physical sensations such as a pounding heartbeat. These feelings make you want to react to the threat. An anxiety response is normal in many situations. But when you have an anxiety disorder, the same response can occur at the wrong times.  Anxiety can be helpful  Normal anxiety is a signal from your brain that warns you of a threat and is a normal response to help you prevent something or decrease the bad effects of something you can't control. For example, anxiety is a normal response to situations that might damage your body, separate you from a loved one, or lose your job. The symptoms of anxiety can be physical and mental.  How does it feel?  At certain times, people with anxiety may have:    Dizziness    Muscle tension or pain    Restlessness    Sleeplessness    Trouble concentrating    Racing heartbeat    Fast breathing    Shaking or trembling    Stomachache    Diarrhea    Loss of energy    Sweating    Cold, clammy hands    Chest pain    Dry mouth  Anxiety can also be a problem  Anxiety can become a problem when it is hard to control, occurs for months, and interferes with important parts of your life. With an anxiety disorder, your body has the response described above, but in inappropriate ways. The response a person has depends on the anxiety disorder he or she has. With some disorders, the anxiety is way out of proportion to the threat that triggers it. With others, anxiety may occur even when there isn t a clear threat or trigger.  Who does it affect?  Some people are more prone to persistent anxiety than others. It tends to run in families, and it affects more younger people than older people, and more women than  "men. But no age, race, or gender is immune to anxiety problems.  Anxiety can be treated  The good news is that the anxiety that s disrupting your life can be treated. Check with your healthcare provider and rule out any physical problems that may be causing the anxiety symptoms. If an anxiety disorder is diagnosed seek mental healthcare. This is an illness and it can respond to treatment. Most types of anxiety disorders will respond to \"talk therapy\" and medicines. Working with your doctor or other healthcare provider, you can develop skills to help you cope with anxiety. You can also gain the perspective you need to overcome your fears. Note: Good sources of support or guidance can be found at your local hospital, mental health clinic, or an employee assistance program.  How to cope with anxiety  If anxiety is wearing you down, here are some things you can do to cope:    Keep in mind that you can t control everything about a situation. Change what you can and let the rest take its course.    Exercise--it s a great way to relieve tension and help your body feel relaxed.    Avoid caffeine and nicotine, which can make anxiety symptoms worse.    Fight the temptation to turn to alcohol or unprescribed drugs for relief. They only make things worse in the long run.    Educate yourself about anxiety disorders. Keep track of helpful online resources and books you can use during stressful periods.    Try stress management techniques such as meditation.    Consider online or in-person support groups.   Date Last Reviewed: 1/1/2017 2000-2019 [employer]. [address] [place] [address]. All rights reserved. This information is not intended as a substitute for professional medical care. Always follow your healthcare professional's instructions.           Patient Education     Adjustment Disorder  Life changes--work, family, parents, children--each can cause a great deal of stress in life. An " adjustment disorder means you have trouble dealing with this change and stress. This problem can have serious results. You may feel helpless, depressed, make bad decisions, or even feel like you want to hurt yourself.  Adjustment disorder can cause anxiety or depression. It is triggered by daily stresses such as:    Death of a loved one    Divorce    Marriage    General life changes such as changing or leaving a job    Moving    Illness or other health issue for you or a family member    Sex    Money     Symptoms may include:    Sadness or crying    Anxiety    Insomnia    Poor concentration    Trouble doing simple things    New problems at work or with family or friends    Loss of self-esteem    Sense of hopelessness    Feeling trapped or cut off from others  With this condition, it is common to feel sad, guilty, hopeless, and restless. These feelings may continue for weeks or months. It can be helpful to identify what is causing the additional stress and take steps to get extra support. If new stressful events do not happen, it is likely that you will gradually start feeling better.  Home care    If you have been given a prescription for medicine, take it as directed.    It helps to talk about your feelings and thoughts with family or friends who understand and support you.  Follow-up care  Follow up with your healthcare provider, or therapist as advised. Let them know if this condition does not improve or gets worse.  When to seek medical advice  Call your healthcare provider right away if any of these happen:    Worsening depression or anxiety    Feeling out of control    Thoughts of harming yourself or another    Being unable to care for yourself  Date Last Reviewed: 10/1/2017    4680-1079 [employer]. [address] [place] [address], [place] [address]. All rights reserved. This information is not intended as a substitute for professional medical care. Always follow your healthcare professional's  "instructions.           Patient Education     Tips for Sleep Hygiene  \"Sleep hygiene\" means having good sleep habits.Follow these tips to sleep better at night:     Get on a schedule. Go to bed and get up at about the same time every day.    Listen to your body. Only try to sleep when you actually feel tired or sleepy.    Be patient. If you haven't been able to get to sleep after about 30 minutes or more, get up and do something calming or boring until you feel sleepy. Then return to bed and try again.    Don't have caffeine (coffee, tea, cola drinks, chocolate and some medicines), alcohol or nicotine (cigarettes). These can make it harder for you to fall asleep and stay asleep.    Use your bed for sleeping only. That means no TV, computer or homework in bed, especially during the evening and before bedtime.    Don't nap during the day. If you must nap, make sure it is for less than 20 minutes.    Create sleep rituals that remind your body it is time to sleep. Examples include breathing exercises, stretching or reading a book.    Avoid all electronic media (smart phone, computer, tablet) within 2 hours of bed time. The \"blue light\" in these devices activates the part of the brain that keeps you awake.    Dim the lights at night.    Get early morning sources of light (walk in the sunshine) to help set sleep patterns at night.    Try a bath or shower before bed. Having a warm bath 1 to 2 hours before bedtime can help you feel sleepy. Hot baths can make you alert, so be mindful of the temperature.    Don't watch the clock. Checking the clock during the night can wake you up. It can also lead to negative thoughts such as, \"I will never fall asleep,\" which can increase anxiety and sleeplessness.    Use a sleep diary. Track your sleep schedule to know your sleep patterns and to see where you can improve.    Get regular exercise every day. Try not to do heavy exercise in the 4 hours before bedtime.    Eat a healthy, " balanced diet.    Try eating a light, healthy snack before bed, but avoid eating a heavy meal.    Create the right sleeping area. A cool, dark, quiet room is best. If needed, try earplugs, fans and blackout curtains.    Keep your daytime routine the same even if you have a bad night sleep. Avoiding activities the next day can make it harder to sleep.  For informational purposes only. Not to replace the advice of your health care provider.   Copyright   2013 [employer]. All rights reserved. [employer] 620565 - 01/16.  For informational purposes only. Not to replace the advice of your health care provider.  Copyright   2018 [employer]. All rights reserved.

## 2020-05-28 NOTE — PROGRESS NOTES
"Kylah Carrasquillo is a 84 year old female who is being evaluated via a billable video visit.      The patient has been notified of following:     \"This video visit will be conducted via a call between you and your physician/provider. We have found that certain health care needs can be provided without the need for an in-person physical exam.  This service lets us provide the care you need with a video conversation.  If a prescription is necessary we can send it directly to your pharmacy.  If lab work is needed we can place an order for that and you can then stop by our lab to have the test done at a later time.    Video visits are billed at different rates depending on your insurance coverage.  Please reach out to your insurance provider with any questions.    If during the course of the call the physician/provider feels a video visit is not appropriate, you will not be charged for this service.\"    Patient has given verbal consent for Video visit? Yes    How would you like to obtain your AVS? Sebastian    Patient would like the video invitation sent by: Send to e-mail at:  [email or web address]    Will anyone else be joining your video visit? Yes family member ( daughter in law?)     Subjective     Kylah Carrasquillo is a 84 year old female who presents today via video visit for the following health issues:    HPI  Diarrhea  Onset: several months on and off last several months . Had sx again last couple of days ago, not bad today . No associated nausea, vomiting, or abdominal pain etc. no weight  Change. Eating ok. Urination normal .   Has been  slow since diagnosed with seizure, and now on med's, although does not think that could e causing the problem with diarrhea .              She is being followed by neurology  for her med's     Description:   Consistency of stool: loose  Blood in stool: no   Number of loose stools in past 24 hours: none -  But last happened Mon./Tues     Progression of Symptoms:  " intermittent    Accompanying Signs & Symptoms:  Fever: no   Nausea or vomiting; no   Abdominal pain: no   Episodes of constipation: no   Weight loss: YES    History:   Ill contacts: no   Recent use of antibiotics: no    Recent travels: no          Recent medication-new or changes(Rx or OTC): YES- from neurologist . Had seen him on Monday this week.   Unsure if she has still small seizure activity still has some shaking etc, so scheduled to do EEG.    So neurology is watching for that .   . Has been more fatigue lately so she was told to check with us on that .   her daughter thinks that she  may have mild anxiety or depression concerns lately.   mood has been down and slightly anxious at times. Sleep is ok, but she wakes up on and off, lack of motivation and energy     Neurology has ordered  labs etc,  so she is planning to schedule labs appointment,  so she is wondering if she needs any other labs that should be done since she will be coming for appointment       Precipitating factors:   Noted     Alleviating factors:   Na     Therapies Tried and outcome:  Modified diet; Outcome: unsure       Video Start Time: 1:44 PM    PROBLEMS TO ADD ON...        Patient Active Problem List   Diagnosis     Mild intermittent asthma     Cardiovascular disease     Raynaud's syndrome     Advanced directives, counseling/discussion     Osteoporosis     Hyperlipidemia LDL goal <100     First degree AV block     Cataracta     Hallux valgus, acquired     Arthritis of right foot     S/P hysterectomy     Overweight (BMI 25.0-29.9)     Confusion     Seizure (H)     Past Surgical History:   Procedure Laterality Date     CYSTOCELE REPAIR  1970's     HEMORRHOIDECTOMY  1970's     HERNIA REPAIR, UMBILICAL  1970's     HYSTERECTOMY, VAGINAL  1988    menorrhagia     PHACOEMULSIFICATION CLEAR CORNEA WITH STANDARD INTRAOCULAR LENS IMPLANT  8/7/2014    Procedure: PHACOEMULSIFICATION CLEAR CORNEA WITH STANDARD INTRAOCULAR LENS IMPLANT;  Surgeon:  "Austin Garcia MD;  Location:  [facility]     PHACOEMULSIFICATION CLEAR CORNEA WITH STANDARD INTRAOCULAR LENS IMPLANT Left 2014    Procedure: PHACOEMULSIFICATION CLEAR CORNEA WITH STANDARD INTRAOCULAR LENS IMPLANT;  Surgeon: Austin Garcia MD;  Location:  [facility]     TONSILLECTOMY  194     TUBAL LIGATION  's       Social History     Tobacco Use     Smoking status: Former Smoker     Last attempt to quit: 11/15/1990     Years since quittin.5     Smokeless tobacco: Never Used   Substance Use Topics     Alcohol use: Yes     Alcohol/week: 0.0 standard drinks     Comment: occassional     Family History   Problem Relation Age of Onset     Asthma Mother      Osteoporosis Mother      C.A.D. Father      Diabetes Daughter      Thyroid Disease Daughter      C.A.D. Brother      C.A.D. Brother      Thyroid Disease Sister      Diabetes Brother      Hypertension No family hx of      Breast Cancer No family hx of      Cancer - colorectal No family hx of      Anesthesia Reaction No family hx of      Blood Disease No family hx of      Eye Disorder No family hx of            Reviewed and updated as needed this visit by Provider         Review of Systems   Constitutional, HEENT, cardiovascular, pulmonary, GI, , musculoskeletal, neuro, skin, endocrine and psych systems are negative, except as otherwise noted.      Objective    There were no vitals taken for this visit.  Estimated body mass index is 23.54 kg/m  as calculated from the following:    Height as of 20: 1.575 m (5' 2\").    Weight as of 20: 58.4 kg (128 lb 11.2 oz).  Physical Exam     GENERAL: Healthy, alert and no distress  EYES: Eyes grossly normal to inspection.  No discharge or erythema, or obvious scleral/conjunctival abnormalities.  RESP:breathing comfortably   NEURO: looks  grossly intact.  Mentation and speech appropriate for age.  PSYCH: mentation appears normal, slightly affect flat, speech pressured and judgement and insight " intact              Assessment & Plan            (R53.83) Fatigue, unspecified type  Comment: likely multifactorial   Plan: *UA reflex to Microscopic and Culture ([facility]         and [facility] (except [place] and         [place])               (F43.23) Adjustment disorder with mixed anxiety and depressed mood  (primary encounter diagnosis)  Comment:   Plan:   Discussed cares, talked about signs and symptoms of anxiety/ depression and treatment options. Discussed treatment option like may be try Zoloft, although reluctant to take med's yet, wants to wait till neurology evaluation is complete. . Pros/ cons of med's discussed . encouraged to see  to help and referral offered but sh declined for now , comfortable just staying and talking to family members for now.  spent sometimes counseling patient. Follow up in 2-3 months, sooner if problem.       (R19.7) Diarrhea, unspecified type  Comment: on and off , better today   Plan: **Comprehensive metabolic panel FUTURE anytime,        CBC with platelets, GASTROENTEROLOGY ADULT REF         CONSULT ONLY      Discussed possible differential diagnosis for her symptoms/ diarrhea.she has had c- diff in past , although recent stool test were negative.  Unsure if it could be functional or stress related vs other causes . Clinically she is doing ok. Talked about hydration, fluids, brat diet etc. Talked about warning s/s for which should be seen. Also gave referral to see GI , to  consider further evaluation if ongoing problem   . She will f/u if problem         Return in about 6 weeks (around 7/9/2020).    Azucena Almanzar MD  [facility]      Video-Visit Details    Type of service:  Video Visit    Video End Time:2:06 PM    Originating Location (pt. Location): Home    Distant Location (provider [facility]     Platform used for Video Visit: Pal Almanzar MD

## 2020-05-29 DIAGNOSIS — R25.3 MUSCLE TWITCH: ICD-10-CM

## 2020-05-29 DIAGNOSIS — G40.109 SPECIAL SENSORY ATTACKS (H): ICD-10-CM

## 2020-05-29 DIAGNOSIS — R25.1 SHAKES: ICD-10-CM

## 2020-05-29 DIAGNOSIS — R41.3 MEMORY LOSS: ICD-10-CM

## 2020-05-29 DIAGNOSIS — R53.83 FATIGUE, UNSPECIFIED TYPE: ICD-10-CM

## 2020-05-29 DIAGNOSIS — R82.90 ABNORMAL URINE FINDINGS: Primary | ICD-10-CM

## 2020-05-29 DIAGNOSIS — R19.7 DIARRHEA, UNSPECIFIED TYPE: ICD-10-CM

## 2020-05-29 LAB
ALBUMIN SERPL-MCNC: 3.6 G/DL (ref 3.4–5)
ALBUMIN UR-MCNC: 30 MG/DL
ALP SERPL-CCNC: 50 U/L (ref 40–150)
ALT SERPL W P-5'-P-CCNC: 24 U/L (ref 0–50)
ANION GAP SERPL CALCULATED.3IONS-SCNC: 7 MMOL/L (ref 3–14)
APPEARANCE UR: ABNORMAL
AST SERPL W P-5'-P-CCNC: 12 U/L (ref 0–45)
BACTERIA #/AREA URNS HPF: ABNORMAL /HPF
BILIRUB SERPL-MCNC: 0.2 MG/DL (ref 0.2–1.3)
BILIRUB UR QL STRIP: NEGATIVE
BUN SERPL-MCNC: 23 MG/DL (ref 7–30)
CALCIUM SERPL-MCNC: 9.4 MG/DL (ref 8.5–10.1)
CHLORIDE SERPL-SCNC: 99 MMOL/L (ref 94–109)
CO2 SERPL-SCNC: 26 MMOL/L (ref 20–32)
COLOR UR AUTO: YELLOW
CREAT SERPL-MCNC: 0.92 MG/DL (ref 0.52–1.04)
ERYTHROCYTE [DISTWIDTH] IN BLOOD BY AUTOMATED COUNT: 12.6 % (ref 10–15)
GFR SERPL CREATININE-BSD FRML MDRD: 57 ML/MIN/{1.73_M2}
GLUCOSE SERPL-MCNC: 130 MG/DL (ref 70–99)
GLUCOSE UR STRIP-MCNC: NEGATIVE MG/DL
HCT VFR BLD AUTO: 43.3 % (ref 35–47)
HGB BLD-MCNC: 14.1 G/DL (ref 11.7–15.7)
HGB UR QL STRIP: NEGATIVE
HYALINE CASTS #/AREA URNS LPF: ABNORMAL /LPF
KETONES UR STRIP-MCNC: NEGATIVE MG/DL
LEUKOCYTE ESTERASE UR QL STRIP: ABNORMAL
MCH RBC QN AUTO: 31.5 PG (ref 26.5–33)
MCHC RBC AUTO-ENTMCNC: 32.6 G/DL (ref 31.5–36.5)
MCV RBC AUTO: 97 FL (ref 78–100)
NITRATE UR QL: NEGATIVE
NON-SQ EPI CELLS #/AREA URNS LPF: ABNORMAL /LPF
PH UR STRIP: 6 PH (ref 5–7)
PHOSPHATE SERPL-MCNC: 3.8 MG/DL (ref 2.5–4.5)
PLATELET # BLD AUTO: 366 10E9/L (ref 150–450)
POTASSIUM SERPL-SCNC: 4.2 MMOL/L (ref 3.4–5.3)
PROT SERPL-MCNC: 7.1 G/DL (ref 6.8–8.8)
RBC # BLD AUTO: 4.47 10E12/L (ref 3.8–5.2)
RBC #/AREA URNS AUTO: ABNORMAL /HPF
SODIUM SERPL-SCNC: 132 MMOL/L (ref 133–144)
SOURCE: ABNORMAL
SP GR UR STRIP: 1.02 (ref 1–1.03)
UROBILINOGEN UR STRIP-MCNC: NORMAL MG/DL (ref 0–2)
WBC # BLD AUTO: 10.1 10E9/L (ref 4–11)
WBC #/AREA URNS AUTO: ABNORMAL /HPF

## 2020-05-29 PROCEDURE — 83520 IMMUNOASSAY QUANT NOS NONAB: CPT | Mod: 90 | Performed by: PSYCHIATRY & NEUROLOGY

## 2020-05-29 PROCEDURE — 87186 SC STD MICRODIL/AGAR DIL: CPT | Performed by: FAMILY MEDICINE

## 2020-05-29 PROCEDURE — 87088 URINE BACTERIA CULTURE: CPT | Performed by: FAMILY MEDICINE

## 2020-05-29 PROCEDURE — 86255 FLUORESCENT ANTIBODY SCREEN: CPT | Mod: 90 | Performed by: PSYCHIATRY & NEUROLOGY

## 2020-05-29 PROCEDURE — 81001 URINALYSIS AUTO W/SCOPE: CPT | Performed by: FAMILY MEDICINE

## 2020-05-29 PROCEDURE — 87086 URINE CULTURE/COLONY COUNT: CPT | Performed by: FAMILY MEDICINE

## 2020-05-29 PROCEDURE — 36415 COLL VENOUS BLD VENIPUNCTURE: CPT | Performed by: PSYCHIATRY & NEUROLOGY

## 2020-05-29 PROCEDURE — 80053 COMPREHEN METABOLIC PANEL: CPT | Performed by: PSYCHIATRY & NEUROLOGY

## 2020-05-29 PROCEDURE — 85027 COMPLETE CBC AUTOMATED: CPT | Performed by: PSYCHIATRY & NEUROLOGY

## 2020-05-29 PROCEDURE — 84100 ASSAY OF PHOSPHORUS: CPT | Performed by: PSYCHIATRY & NEUROLOGY

## 2020-05-29 PROCEDURE — 99000 SPECIMEN HANDLING OFFICE-LAB: CPT | Performed by: PSYCHIATRY & NEUROLOGY

## 2020-05-29 PROCEDURE — 83519 RIA NONANTIBODY: CPT | Mod: 59 | Performed by: PSYCHIATRY & NEUROLOGY

## 2020-05-31 LAB
BACTERIA SPEC CULT: ABNORMAL
BACTERIA SPEC CULT: ABNORMAL
SPECIMEN SOURCE: ABNORMAL

## 2020-05-31 RX ORDER — SULFAMETHOXAZOLE AND TRIMETHOPRIM 400; 80 MG/1; MG/1
1 TABLET ORAL 2 TIMES DAILY
Qty: 6 TABLET | Refills: 0 | Status: SHIPPED | OUTPATIENT
Start: 2020-05-31 | End: 2020-08-13

## 2020-06-10 ENCOUNTER — HOSPITAL ENCOUNTER (OUTPATIENT)
Dept: OCCUPATIONAL THERAPY | Facility: CLINIC | Age: 85
Setting detail: THERAPIES SERIES
End: 2020-06-10
Attending: INTERNAL MEDICINE
Payer: COMMERCIAL

## 2020-06-10 LAB
CASPR2 IGG CELL BINDING: NEGATIVE
LGI1 IGG CELL BINDING: POSITIVE

## 2020-06-10 PROCEDURE — 97535 SELF CARE MNGMENT TRAINING: CPT | Mod: GO | Performed by: REHABILITATION PRACTITIONER

## 2020-06-11 ENCOUNTER — HOSPITAL ENCOUNTER (OUTPATIENT)
Dept: OCCUPATIONAL THERAPY | Facility: CLINIC | Age: 85
Setting detail: THERAPIES SERIES
End: 2020-06-11
Attending: INTERNAL MEDICINE
Payer: COMMERCIAL

## 2020-06-11 LAB — PNP ABY SERUM: NORMAL

## 2020-06-11 PROCEDURE — 97530 THERAPEUTIC ACTIVITIES: CPT | Mod: GO | Performed by: REHABILITATION PRACTITIONER

## 2020-06-11 PROCEDURE — 97535 SELF CARE MNGMENT TRAINING: CPT | Mod: GO | Performed by: REHABILITATION PRACTITIONER

## 2020-06-11 PROCEDURE — 97112 NEUROMUSCULAR REEDUCATION: CPT | Mod: GO | Performed by: REHABILITATION PRACTITIONER

## 2020-06-16 ENCOUNTER — HOSPITAL ENCOUNTER (OUTPATIENT)
Dept: OCCUPATIONAL THERAPY | Facility: CLINIC | Age: 85
Setting detail: THERAPIES SERIES
End: 2020-06-16
Attending: INTERNAL MEDICINE
Payer: COMMERCIAL

## 2020-06-16 PROCEDURE — 97535 SELF CARE MNGMENT TRAINING: CPT | Mod: GO | Performed by: OCCUPATIONAL THERAPIST

## 2020-06-22 DIAGNOSIS — R25.1 SHAKES: ICD-10-CM

## 2020-06-22 DIAGNOSIS — R56.9 SEIZURE (H): ICD-10-CM

## 2020-06-22 DIAGNOSIS — J45.20 MILD INTERMITTENT ASTHMA WITHOUT COMPLICATION: ICD-10-CM

## 2020-06-22 DIAGNOSIS — R53.83 FATIGUE: ICD-10-CM

## 2020-06-22 DIAGNOSIS — E78.5 HYPERLIPIDEMIA LDL GOAL <100: ICD-10-CM

## 2020-06-22 DIAGNOSIS — I25.10 CARDIOVASCULAR DISEASE: ICD-10-CM

## 2020-06-22 DIAGNOSIS — I44.0 FIRST DEGREE AV BLOCK: ICD-10-CM

## 2020-06-22 DIAGNOSIS — R41.3 MEMORY LOSS: ICD-10-CM

## 2020-06-22 DIAGNOSIS — Z00.00 ROUTINE GENERAL MEDICAL EXAMINATION AT A HEALTH CARE FACILITY: ICD-10-CM

## 2020-06-22 DIAGNOSIS — G04.90 INFLAMMATORY DISEASE OF THE CENTRAL NERVOUS SYSTEM: ICD-10-CM

## 2020-06-22 DIAGNOSIS — M79.89 SWELLING OF LIMB: ICD-10-CM

## 2020-06-22 DIAGNOSIS — R41.0 SUBACUTE DELIRIUM: ICD-10-CM

## 2020-06-22 DIAGNOSIS — G40.109 SPECIAL SENSORY ATTACKS (H): ICD-10-CM

## 2020-06-22 DIAGNOSIS — Z79.899 ENCOUNTER FOR LONG-TERM (CURRENT) USE OF MEDICATIONS: ICD-10-CM

## 2020-06-22 DIAGNOSIS — I50.9 HEART FAILURE, UNSPECIFIED (H): ICD-10-CM

## 2020-06-22 DIAGNOSIS — R56.9 SEIZURE (H): Primary | ICD-10-CM

## 2020-06-22 DIAGNOSIS — R41.0 CONFUSION: ICD-10-CM

## 2020-06-22 DIAGNOSIS — R41.82 ALTERED MENTAL STATUS: ICD-10-CM

## 2020-06-22 LAB
ALBUMIN SERPL-MCNC: 3.4 G/DL (ref 3.4–5)
ALP SERPL-CCNC: 40 U/L (ref 40–150)
ALT SERPL W P-5'-P-CCNC: 25 U/L (ref 0–50)
ANION GAP SERPL CALCULATED.3IONS-SCNC: 5 MMOL/L (ref 3–14)
AST SERPL W P-5'-P-CCNC: 8 U/L (ref 0–45)
BASOPHILS # BLD AUTO: 0.1 10E9/L (ref 0–0.2)
BASOPHILS NFR BLD AUTO: 0.4 %
BILIRUB SERPL-MCNC: 0.3 MG/DL (ref 0.2–1.3)
BUN SERPL-MCNC: 25 MG/DL (ref 7–30)
CALCIUM SERPL-MCNC: 9.1 MG/DL (ref 8.5–10.1)
CHLORIDE SERPL-SCNC: 103 MMOL/L (ref 94–109)
CO2 SERPL-SCNC: 28 MMOL/L (ref 20–32)
CREAT SERPL-MCNC: 0.84 MG/DL (ref 0.52–1.04)
DIFFERENTIAL METHOD BLD: ABNORMAL
EOSINOPHIL # BLD AUTO: 0 10E9/L (ref 0–0.7)
EOSINOPHIL NFR BLD AUTO: 0.1 %
ERYTHROCYTE [DISTWIDTH] IN BLOOD BY AUTOMATED COUNT: 13.1 % (ref 10–15)
GFR SERPL CREATININE-BSD FRML MDRD: 63 ML/MIN/{1.73_M2}
GLUCOSE SERPL-MCNC: 226 MG/DL (ref 70–99)
HCT VFR BLD AUTO: 44 % (ref 35–47)
HGB BLD-MCNC: 14.1 G/DL (ref 11.7–15.7)
IMM GRANULOCYTES # BLD: 0.3 10E9/L (ref 0–0.4)
IMM GRANULOCYTES NFR BLD: 2.3 %
LYMPHOCYTES # BLD AUTO: 2.4 10E9/L (ref 0.8–5.3)
LYMPHOCYTES NFR BLD AUTO: 17.1 %
MCH RBC QN AUTO: 31.1 PG (ref 26.5–33)
MCHC RBC AUTO-ENTMCNC: 32 G/DL (ref 31.5–36.5)
MCV RBC AUTO: 97 FL (ref 78–100)
MONOCYTES # BLD AUTO: 0.2 10E9/L (ref 0–1.3)
MONOCYTES NFR BLD AUTO: 1.6 %
NEUTROPHILS # BLD AUTO: 11 10E9/L (ref 1.6–8.3)
NEUTROPHILS NFR BLD AUTO: 78.5 %
NT-PROBNP SERPL-MCNC: 484 PG/ML (ref 0–450)
PLATELET # BLD AUTO: 319 10E9/L (ref 150–450)
POTASSIUM SERPL-SCNC: 4 MMOL/L (ref 3.4–5.3)
PROT SERPL-MCNC: 6.8 G/DL (ref 6.8–8.8)
RBC # BLD AUTO: 4.54 10E12/L (ref 3.8–5.2)
SODIUM SERPL-SCNC: 136 MMOL/L (ref 133–144)
WBC # BLD AUTO: 14 10E9/L (ref 4–11)

## 2020-06-22 PROCEDURE — 80177 DRUG SCRN QUAN LEVETIRACETAM: CPT | Mod: 90 | Performed by: PSYCHIATRY & NEUROLOGY

## 2020-06-22 PROCEDURE — 99000 SPECIMEN HANDLING OFFICE-LAB: CPT | Performed by: PSYCHIATRY & NEUROLOGY

## 2020-06-22 PROCEDURE — 36415 COLL VENOUS BLD VENIPUNCTURE: CPT | Performed by: PSYCHIATRY & NEUROLOGY

## 2020-06-22 PROCEDURE — 80053 COMPREHEN METABOLIC PANEL: CPT | Performed by: PSYCHIATRY & NEUROLOGY

## 2020-06-22 PROCEDURE — 83880 ASSAY OF NATRIURETIC PEPTIDE: CPT | Performed by: PSYCHIATRY & NEUROLOGY

## 2020-06-22 PROCEDURE — 85025 COMPLETE CBC W/AUTO DIFF WBC: CPT | Performed by: PSYCHIATRY & NEUROLOGY

## 2020-06-23 LAB — LEVETIRACETAM SERPL-MCNC: 30 UG/ML (ref 12–46)

## 2020-06-24 ENCOUNTER — HOSPITAL ENCOUNTER (OUTPATIENT)
Dept: OCCUPATIONAL THERAPY | Facility: CLINIC | Age: 85
Setting detail: THERAPIES SERIES
End: 2020-06-24
Attending: INTERNAL MEDICINE
Payer: COMMERCIAL

## 2020-06-24 PROCEDURE — 97535 SELF CARE MNGMENT TRAINING: CPT | Mod: GO | Performed by: REHABILITATION PRACTITIONER

## 2020-06-25 ENCOUNTER — HOSPITAL ENCOUNTER (OUTPATIENT)
Dept: OCCUPATIONAL THERAPY | Facility: CLINIC | Age: 85
Setting detail: THERAPIES SERIES
End: 2020-06-25
Attending: INTERNAL MEDICINE
Payer: COMMERCIAL

## 2020-06-25 PROCEDURE — 97535 SELF CARE MNGMENT TRAINING: CPT | Mod: GO | Performed by: REHABILITATION PRACTITIONER

## 2020-07-02 ENCOUNTER — TELEPHONE (OUTPATIENT)
Dept: FAMILY MEDICINE | Facility: CLINIC | Age: 85
End: 2020-07-02

## 2020-07-02 NOTE — TELEPHONE ENCOUNTER
Ok thanks .   Although remind pt , that she was supposed to do follow up on her anxiety .  If she  has discussed that with neurology and she thinks she is doing ok, then it is ok to just watch for now , if any problem she can schedule a follow up  later another day

## 2020-07-02 NOTE — TELEPHONE ENCOUNTER
Routing to team to inform and assist with scheduling. Thank you very much.     Anna Atkinson RN, BSN  [facility]

## 2020-07-02 NOTE — TELEPHONE ENCOUNTER
Called patient to set up telephone visit with provider, pt states that she was not expecting a telephone visit with Dr. Almanzar due to seeing the neurologist and being told that glucose levels have been normal. Pt states she is recovering and will gain the ability to live on her own again and will follow up with PCP for routine visits. Pt declined telephone visit today.     Adilia Casillas MA on 7/2/2020 at 1:16 PM

## 2020-07-02 NOTE — TELEPHONE ENCOUNTER
Spoke with pt and she is doing ok right now. She has an appt with Neurology on July 23. She has several family members that will be with her over the next 6 weeks. She will call the clinic if she needs anything. She thanks Dr Almanzar for caring so much.  Amaris Recinos,

## 2020-07-09 ENCOUNTER — HOSPITAL ENCOUNTER (OUTPATIENT)
Dept: OCCUPATIONAL THERAPY | Facility: CLINIC | Age: 85
Setting detail: THERAPIES SERIES
End: 2020-07-09
Attending: INTERNAL MEDICINE
Payer: COMMERCIAL

## 2020-07-09 PROCEDURE — 97535 SELF CARE MNGMENT TRAINING: CPT | Mod: GO | Performed by: REHABILITATION PRACTITIONER

## 2020-07-16 ENCOUNTER — TRANSFERRED RECORDS (OUTPATIENT)
Dept: HEALTH INFORMATION MANAGEMENT | Facility: CLINIC | Age: 85
End: 2020-07-16

## 2020-07-17 ENCOUNTER — HOSPITAL ENCOUNTER (OUTPATIENT)
Dept: OCCUPATIONAL THERAPY | Facility: CLINIC | Age: 85
Setting detail: THERAPIES SERIES
End: 2020-07-17
Attending: INTERNAL MEDICINE
Payer: COMMERCIAL

## 2020-07-17 PROCEDURE — 97535 SELF CARE MNGMENT TRAINING: CPT | Mod: GO | Performed by: OCCUPATIONAL THERAPIST

## 2020-07-23 ENCOUNTER — HOSPITAL ENCOUNTER (OUTPATIENT)
Dept: OCCUPATIONAL THERAPY | Facility: CLINIC | Age: 85
Setting detail: THERAPIES SERIES
End: 2020-07-23
Attending: INTERNAL MEDICINE
Payer: COMMERCIAL

## 2020-07-23 PROCEDURE — 97535 SELF CARE MNGMENT TRAINING: CPT | Mod: GO | Performed by: REHABILITATION PRACTITIONER

## 2020-07-27 DIAGNOSIS — F43.23 ADJUSTMENT DISORDER WITH MIXED ANXIETY AND DEPRESSED MOOD: Primary | ICD-10-CM

## 2020-07-30 ENCOUNTER — HOSPITAL ENCOUNTER (OUTPATIENT)
Dept: OCCUPATIONAL THERAPY | Facility: CLINIC | Age: 85
Setting detail: THERAPIES SERIES
End: 2020-07-30
Attending: INTERNAL MEDICINE
Payer: COMMERCIAL

## 2020-07-30 PROCEDURE — 97535 SELF CARE MNGMENT TRAINING: CPT | Mod: GO | Performed by: REHABILITATION PRACTITIONER

## 2020-08-06 ENCOUNTER — HOSPITAL ENCOUNTER (OUTPATIENT)
Dept: OCCUPATIONAL THERAPY | Facility: CLINIC | Age: 85
Setting detail: THERAPIES SERIES
End: 2020-08-06
Attending: INTERNAL MEDICINE
Payer: COMMERCIAL

## 2020-08-06 ENCOUNTER — TRANSFERRED RECORDS (OUTPATIENT)
Dept: HEALTH INFORMATION MANAGEMENT | Facility: CLINIC | Age: 85
End: 2020-08-06

## 2020-08-06 LAB
ALT SERPL-CCNC: 18 IU/L (ref 0–32)
AST SERPL-CCNC: 15 IU/L (ref 0–40)
CREAT SERPL-MCNC: 1.04 MG/DL (ref 0.57–1)
GFR SERPL CREATININE-BSD FRML MDRD: 49 ML/MIN/1.73
GLUCOSE SERPL-MCNC: 185 MG/DL (ref 65–99)
POTASSIUM SERPL-SCNC: 4.8 MMOL/L (ref 3.5–5.2)

## 2020-08-06 PROCEDURE — 97535 SELF CARE MNGMENT TRAINING: CPT | Mod: GO | Performed by: REHABILITATION PRACTITIONER

## 2020-08-06 PROCEDURE — 97537 COMMUNITY/WORK REINTEGRATION: CPT | Mod: GO | Performed by: REHABILITATION PRACTITIONER

## 2020-08-11 ENCOUNTER — TELEPHONE (OUTPATIENT)
Dept: FAMILY MEDICINE | Facility: CLINIC | Age: 85
End: 2020-08-11

## 2020-08-11 NOTE — TELEPHONE ENCOUNTER
FYI - Status Update  Who is Calling: patient  Update: There is going to be test results coming from the Pt's neurologist, and wants Dr. Almanzar to review. Per patient states that it is important for Dr. Almanzar to review and get back to her asap and that neurologist office is sending it immediately.  Does caller want a call back: Yes  Okay to leave a detailed message?:  Yes at Cell number on file:    Telephone Information:   Mobile [phone] Pt states call this number first and if no answer can try her son's number [phone] to reach her

## 2020-08-12 ENCOUNTER — HOSPITAL ENCOUNTER (OUTPATIENT)
Dept: OCCUPATIONAL THERAPY | Facility: CLINIC | Age: 85
Setting detail: THERAPIES SERIES
End: 2020-08-12
Attending: INTERNAL MEDICINE
Payer: COMMERCIAL

## 2020-08-12 PROCEDURE — 97535 SELF CARE MNGMENT TRAINING: CPT | Mod: GO | Performed by: OCCUPATIONAL THERAPIST

## 2020-08-12 PROCEDURE — 97537 COMMUNITY/WORK REINTEGRATION: CPT | Mod: GO | Performed by: OCCUPATIONAL THERAPIST

## 2020-08-12 NOTE — TELEPHONE ENCOUNTER
General Call:   Who is calling:  Daughter is calling, She does not have consent to communicate  Reason for Call:  Family is wanting lab results and what are next steps.  Pt does not recall from yesterday.   We are to call her son , Shimon Robins has consent to communicate  What are your questions or concerns:  na  Date of last appointment with provider: na  Okay to leave a detailed message:Yes at Other phone number:  [phone]

## 2020-08-12 NOTE — TELEPHONE ENCOUNTER
S/w pt's son Shimon who states for the past 5 days has been having middle to lower back pain.  Shimon states the pain is affecting pt's sleep.  She does have hx of osteoporosis.  Did ibuprofen/tylenol q 6 hours for 3 days since neuro did not want her doing more than that.  Has been using heat and ice which is helping.  He also has a brace around her.  Pain is affecting pt's mood to where she is more crabby.  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    Scheduled with Dangelo on Thursday 1:20 pm.    Shimon is wondering if clinic received lab work that was done by neurologist on Thursday 8/6?  Was told by Neurology coordinator she was going to fax those over on Monday and to go over results with PCP.  Shimon states pt's glucose, calcium, and Cr are elevated and advised to discuss with PCP.  Advised not sure if the clinic received these results.  Shimon is going to call the coordinator again and have the results faxed to Team #3 fax number [phone].    Lorelei WILDER RN  EP Triage

## 2020-08-13 ENCOUNTER — OFFICE VISIT (OUTPATIENT)
Dept: FAMILY MEDICINE | Facility: CLINIC | Age: 85
End: 2020-08-13
Payer: COMMERCIAL

## 2020-08-13 VITALS
OXYGEN SATURATION: 97 % | BODY MASS INDEX: 22.63 KG/M2 | DIASTOLIC BLOOD PRESSURE: 58 MMHG | WEIGHT: 123 LBS | SYSTOLIC BLOOD PRESSURE: 110 MMHG | HEART RATE: 67 BPM | HEIGHT: 62 IN | TEMPERATURE: 97.9 F

## 2020-08-13 DIAGNOSIS — I73.00 RAYNAUD'S DISEASE WITHOUT GANGRENE: ICD-10-CM

## 2020-08-13 DIAGNOSIS — J45.30 MILD PERSISTENT ASTHMA WITHOUT COMPLICATION: ICD-10-CM

## 2020-08-13 DIAGNOSIS — F43.25 ADJUSTMENT REACTION WITH MIXED DISTURBANCE OF EMOTIONS AND CONDUCT: ICD-10-CM

## 2020-08-13 DIAGNOSIS — R89.9 ABNORMAL LABORATORY TEST RESULT: Primary | ICD-10-CM

## 2020-08-13 DIAGNOSIS — Z71.3 DIETARY COUNSELING AND SURVEILLANCE: ICD-10-CM

## 2020-08-13 DIAGNOSIS — G89.29 CHRONIC MIDLINE THORACIC BACK PAIN: ICD-10-CM

## 2020-08-13 DIAGNOSIS — M54.6 CHRONIC MIDLINE THORACIC BACK PAIN: ICD-10-CM

## 2020-08-13 PROCEDURE — 99214 OFFICE O/P EST MOD 30 MIN: CPT | Performed by: NURSE PRACTITIONER

## 2020-08-13 RX ORDER — ALBUTEROL SULFATE 90 UG/1
2 AEROSOL, METERED RESPIRATORY (INHALATION) 4 TIMES DAILY PRN
Qty: 2 INHALER | Refills: 1 | Status: SHIPPED | OUTPATIENT
Start: 2020-08-13

## 2020-08-13 RX ORDER — DEXAMETHASONE 4 MG/1
TABLET ORAL
Qty: 36 G | Refills: 3 | Status: SHIPPED | OUTPATIENT
Start: 2020-08-13 | End: 2021-08-24

## 2020-08-13 RX ORDER — AMLODIPINE BESYLATE 2.5 MG/1
2.5 TABLET ORAL DAILY
Qty: 90 TABLET | Refills: 1 | Status: SHIPPED | OUTPATIENT
Start: 2020-08-13 | End: 2021-06-30

## 2020-08-13 RX ORDER — TIOTROPIUM BROMIDE 18 UG/1
CAPSULE ORAL; RESPIRATORY (INHALATION)
Qty: 90 CAPSULE | Refills: 1 | Status: SHIPPED | OUTPATIENT
Start: 2020-08-13 | End: 2021-04-08

## 2020-08-13 ASSESSMENT — MIFFLIN-ST. JEOR: SCORE: 961.17

## 2020-08-13 NOTE — PROGRESS NOTES
Subjective     Kylah Carrasquillo is a 84 year old female who presents to clinic today for the following health issues:      Back Pain       Duration: ongoing- flare up 2-3 weeks ago- hx of osteoporosis         Specific cause: none    Description:   Location of pain: middle of back bilateral  Character of pain: dull ache and spasm   Pain radiation:none  New numbness or weakness in legs, not attributed to pain:  no     Intensity: mild, severe, constant      History:   Pain interferes with job: YES  History of back problems: osteoporosis    Any previous MRI or X-rays: None  Sees a specialist for back pain:  No  Therapies tried without relief: advil , acetaminophen (Tylenol), cold and heat    Alleviating factors:   Improved by: none      Precipitating factors:  Worsened by: Bending        Accompanying Signs & Symptoms:  Risk of Fracture:  Osteoporosis, Corticosteroid use and Age >64  Risk of Cauda Equina:  None  Risk of Infection:  None  Risk of Cancer:  None  Risk of Ankylosing Spondylitis:  Onset at age <35, male, AND morning back stiffness. no       HPI: Kylah presents today (accompanied by her daughter) with several questions / issues.    1. She has been seeing neurology and OT since April after having been admitted to the hospital with confusion, memory loss, and seizure. 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 She has been struggling with outbursts / neal, confusion, and memory loss due to this condition. She is taking high doses or oral prednisone daily for this issue, as well as Keppra. Neurology checks labs periodically and has asked her to follow up with her primary care clinic to ask about a couple abnormal findings:    BG (non-fasting): 185  Serum calcium: 10.5  Creatinine: 1.04    2. She has known osteoporosis and struggles with back pain. Over the past couple weeks, her back pain has worsened (no trauma or overuse). She has tried ice / heat, and APAP with ibuprofen (only for a  couple days). She is on omeprazole and high-dose prednisone. No red flag symptoms (limb weakness, numbness / tingling). She does endorse mild muscle spasm. Bending provokes pain, especially.     3. She is having a hard time adjusting to her new reality. She feels like her unpredictable behavior (related to encephalitis and the steroids) has caused her to give up hope. She states that she has thought that it would be best if she doesn't survive this. She would like to start seeing a counselor at this point to help her deal with these thoughts. No suicidal ideation, plan, or intent.     4. She is also curious about seeing a nutritionist / dietitian. She wonders if there are certain foods that aggravate versus improve her symptoms. She would like a referral.     Patient Active Problem List   Diagnosis     Mild intermittent asthma     Cardiovascular disease     Raynaud's syndrome     Advanced directives, counseling/discussion     Osteoporosis     Hyperlipidemia LDL goal <100     First degree AV block     Cataracta     Hallux valgus, acquired     Arthritis of right foot     S/P hysterectomy     Overweight (BMI 25.0-29.9)     Confusion     Seizure (H)     Past Surgical History:   Procedure Laterality Date     CYSTOCELE REPAIR  1970's     HEMORRHOIDECTOMY  1970's     HERNIA REPAIR, UMBILICAL  1970's     HYSTERECTOMY, VAGINAL  1988    menorrhagia     PHACOEMULSIFICATION CLEAR CORNEA WITH STANDARD INTRAOCULAR LENS IMPLANT  8/7/2014    Procedure: PHACOEMULSIFICATION CLEAR CORNEA WITH STANDARD INTRAOCULAR LENS IMPLANT;  Surgeon: Austin Garcia MD;  Location: [facility]     PHACOEMULSIFICATION CLEAR CORNEA WITH STANDARD INTRAOCULAR LENS IMPLANT Left 9/25/2014    Procedure: PHACOEMULSIFICATION CLEAR CORNEA WITH STANDARD INTRAOCULAR LENS IMPLANT;  Surgeon: Austin Garcia MD;  Location: [facility]     TONSILLECTOMY  1945     TUBAL LIGATION  1970's       Social History     Tobacco Use     Smoking status: Former Smoker      "Last attempt to quit: 11/15/1990     Years since quittin.7     Smokeless tobacco: Never Used   Substance Use Topics     Alcohol use: Yes     Alcohol/week: 0.0 standard drinks     Comment: occassional     Family History   Problem Relation Age of Onset     Asthma Mother      Osteoporosis Mother      C.A.D. Father      Diabetes Daughter      Thyroid Disease Daughter      C.A.D. Brother      C.A.D. Brother      Thyroid Disease Sister      Diabetes Brother      Hypertension No family hx of      Breast Cancer No family hx of      Cancer - colorectal No family hx of      Anesthesia Reaction No family hx of      Blood Disease No family hx of      Eye Disorder No family hx of            Reviewed and updated as needed this visit by Provider  Tobacco  Allergies  Meds  Problems  Med Hx  Surg Hx  Fam Hx         Review of Systems   Constitutional, HEENT, musculoskeletal, neuro, endocrine and psych systems are negative, except as otherwise noted.      Objective    /58   Pulse 67   Temp 97.9  F (36.6  C) (Tympanic)   Ht 1.575 m (5' 2\")   Wt 55.8 kg (123 lb)   SpO2 97%   BMI 22.50 kg/m    Body mass index is 22.5 kg/m .  Physical Exam   GENERAL: healthy, alert and no distress  HENT: ear canals and TM's normal, nose and mouth without ulcers or lesions  RESP: lungs clear to auscultation - no rales, rhonchi or wheezes  CV: regular rate and rhythm, normal S1 S2, no S3 or S4, no murmur, click or rub, no peripheral edema and peripheral pulses strong  NEURO: Normal strength and tone, mentation intact and speech normal  BACK: no CVA tenderness, no paralumbar tenderness    Diagnostic Test Results:  Labs reviewed in Epic        Assessment & Plan     Kylah was seen today for back pain.    Diagnoses and all orders for this visit:    Abnormal laboratory test result  Comment: We discussed that ibuprofen use and dehydration can lead to creatinine elevation. The catabolic effects of prednisone can also theoretically cause a " rise in serum creatinine. She will stop using ibuprofen and focus on remaining well-hydrated, and we can recheck this in the coming weeks. If it continues to rise, we can consult nephrology. Regarding her calcium, prednisone has also been implicated in prompting a rise in serum calcium. Again, we will watch this and check in a month or so. Finally, her BG reading of 185 (which was not fasting) can also likely be attributed to her steroid therapy (60 mg daily). We will keep an eye on all of these labs going forward and consider changing her therapy if indicated (per neurology).     Chronic midline thoracic back pain  Comment: No red flags. She does have osteoporosis and is on a PPI and a steroid at present, so I am a little concerned about rapid bone mineral density loss. Will treat conservatively with ice / heat, rest, gentle stretch, and ibuprofen at this point. If this doesn't improve, we could consider a muscle relaxant, but I hesitate now given her age and mental / cognitive issues.     Adjustment reaction with mixed disturbance of emotions and conduct  Comment: We talked this over, and we feel like she may benefit from counseling. Order placed for referral.   -     MENTAL HEALTH REFERRAL  - Adult; Outpatient Treatment; Individual/Couples/Family/Group Therapy/Health Psychology; [facility]: [facility] [phone]; We will contact you to schedule the appointment or please call with any questions    Dietary counseling and surveillance  Comment: Will have her visit with dietitian for guidance on how her nutrition may or may not affect her condition.   -     NUTRITION REFERRAL    Mild persistent asthma without complication  -     albuterol (VENTOLIN HFA) 108 (90 Base) MCG/ACT inhaler; Inhale 2 puffs into the lungs 4 times daily as needed for wheezing  -     fluticasone (FLOVENT HFA) 110 MCG/ACT inhaler; INHALE 2 PUFFS BY MOUTH TWICE DAILY  -     tiotropium (SPIRIVA HANDIHALER) 18 MCG inhaled capsule;  INHALE  THE CONTENTS OF 1 CAPSULE ONCE DAILY    Raynaud's disease without gangrene  -     amLODIPine (NORVASC) 2.5 MG tablet; Take 1 tablet (2.5 mg) by mouth daily        See Patient Instructions    Return in about 4 weeks (around 9/10/2020) for Lab Work.    Colton Gar NP  [facility]

## 2020-08-13 NOTE — TELEPHONE ENCOUNTER
Results received from [facility] and given to Dangelo Gar for review before appt today.  Amaris Recinos,

## 2020-08-20 ENCOUNTER — HOSPITAL ENCOUNTER (OUTPATIENT)
Dept: OCCUPATIONAL THERAPY | Facility: CLINIC | Age: 85
Setting detail: THERAPIES SERIES
End: 2020-08-20
Attending: INTERNAL MEDICINE
Payer: COMMERCIAL

## 2020-08-20 PROCEDURE — 97537 COMMUNITY/WORK REINTEGRATION: CPT | Mod: GO | Performed by: REHABILITATION PRACTITIONER

## 2020-08-20 PROCEDURE — 97535 SELF CARE MNGMENT TRAINING: CPT | Mod: GO | Performed by: REHABILITATION PRACTITIONER

## 2020-08-20 NOTE — PROGRESS NOTES
"Outpatient Occupational Therapy Discharge Note     Patient: Kylah Carrasquillo  : 1935    Beginning/End Dates of Reporting Period:  20 to 2020    Referring Provider: Dr. Aaron Lopez, Primary Dr. Almanzar    Therapy Diagnosis: Impaired ADL/IADL I    Client Self Report: Daughter Dixie present.  Pt reports \"I have only been seeing you since April.  I thought it was longer than that.\"      Objective Measurements:     Objective Measure: MOCA    Details:  (26+ WNL)   Objective Measure: Dynavision   Details: 50, 54 (52+ WNL), with sponges   Objective Measure: Dynavision Mode B   Details: 38 (42+)      Goals:     Goal Identifier 1 Daily schedule   Goal Description Pt will demonstrate Mod I with managing daily schedule and recall for appointments using 1-2 memory strategies consistently 7/7 days per pt report and demonstrated by independent use of memory binder.   Target Date 20   Date Met      Progress:     Goal Identifier 2 HEP   Goal Description Pt will complete coordination HEP 5/7 days for 10-15 min with mod I to increase strength and coordination for ADL tasks as measured through improved  strength and  9 hole peg test and improved legibility with writing per pt report.   Target Date 20   Date Met      Progress:     Goal Identifier 3 Medication management   Goal Description Pt will complete medication management task with 4+ novel medications and 100% accuracy and use of 2 strategies for organization.   Target Date 20   Date Met      Progress:     Goal Identifier 4 CPT   Goal Description Pt and family will participate in CPT, verbalize understanding of results and recommendations for safety.     Target Date 20   Date Met  20   Progress:     Goal Identifier 5 meal prep   Goal Description Pt will complete 2 dish meal prep task with mod I safely with 100% accuracy following written directions.    Target Date 20   Date Met      Progress:     Goal Identifier 6 " numerical reasoning   Goal Description Pt will demonstrate 90% accuracy on complex financial management task with use of 1-2 strategies to improve organization with mod I.     Target Date 07/22/20   Date Met  05/27/20   Progress:     Goal Identifier 7 Community reintegration   Goal Description Pt will demonstrate 90% accuracy on complex calendar management task with use of 1-2 strategies to improve organization with mod I.     Target Date 07/22/20   Date Met      Progress:       Progress Toward Goals:   Progress this reporting period: Goals 1, 2, 3, 5, 7 remain unmet.  Pt demonstrates need for assist with the following tasks: Managing finances, managing and taking medications, following daily schedule, complex cooking tasks, driving, attending appointments and needs someone with her to go out in the community.  Pt would likely do well living in a senior living environment if she returns back to [place] and continues to need assistance or get daily home care assistance.      Plan:  Discharge from therapy.    Discharge:    Reason for Discharge: Pt is moving to [place] to live with daughter for undetermined amount of time.  Recommend continued assistance with all things listed above.  Will resume therapy if she returns to [place]    Equipment Issued: Putty    Discharge Plan: Patient to continue home program.  Other services: Mental health services.

## 2020-11-10 ENCOUNTER — TRANSFERRED RECORDS (OUTPATIENT)
Dept: HEALTH INFORMATION MANAGEMENT | Facility: CLINIC | Age: 85
End: 2020-11-10

## 2020-11-10 LAB
ALT SERPL-CCNC: 19 IU/L (ref 0–32)
AST SERPL-CCNC: 15 IU/L (ref 0–40)
CREAT SERPL-MCNC: 0.93 MG/DL (ref 0.57–1)
GFR SERPL CREATININE-BSD FRML MDRD: 57 ML/MIN/1.73
GLUCOSE SERPL-MCNC: 110 MG/DL (ref 65–99)
POTASSIUM SERPL-SCNC: 5.3 MMOL/L (ref 3.5–5.2)

## 2020-11-29 ENCOUNTER — HEALTH MAINTENANCE LETTER (OUTPATIENT)
Age: 85
End: 2020-11-29

## 2021-01-14 ENCOUNTER — TRANSFERRED RECORDS (OUTPATIENT)
Dept: HEALTH INFORMATION MANAGEMENT | Facility: CLINIC | Age: 86
End: 2021-01-14

## 2021-02-09 ENCOUNTER — IMMUNIZATION (OUTPATIENT)
Dept: PEDIATRICS | Facility: CLINIC | Age: 86
End: 2021-02-09
Payer: COMMERCIAL

## 2021-02-09 PROCEDURE — 0001A PR COVID VAC PFIZER DIL RECON 30 MCG/0.3 ML IM: CPT

## 2021-02-09 PROCEDURE — 91300 PR COVID VAC PFIZER DIL RECON 30 MCG/0.3 ML IM: CPT

## 2021-03-02 ENCOUNTER — IMMUNIZATION (OUTPATIENT)
Dept: PEDIATRICS | Facility: CLINIC | Age: 86
End: 2021-03-02
Attending: INTERNAL MEDICINE
Payer: COMMERCIAL

## 2021-03-02 PROCEDURE — 91300 PR COVID VAC PFIZER DIL RECON 30 MCG/0.3 ML IM: CPT

## 2021-03-02 PROCEDURE — 0002A PR COVID VAC PFIZER DIL RECON 30 MCG/0.3 ML IM: CPT

## 2021-03-31 DIAGNOSIS — J45.30 MILD PERSISTENT ASTHMA WITHOUT COMPLICATION: ICD-10-CM

## 2021-04-08 RX ORDER — TIOTROPIUM BROMIDE 18 UG/1
CAPSULE ORAL; RESPIRATORY (INHALATION)
Qty: 90 CAPSULE | Refills: 0 | Status: SHIPPED | OUTPATIENT
Start: 2021-04-08

## 2021-04-08 NOTE — TELEPHONE ENCOUNTER
Left message on answering machine for patient to call back.    Margo SMITHRN BSN  [facility]  [phone]

## 2021-06-28 DIAGNOSIS — I73.00 RAYNAUD'S DISEASE WITHOUT GANGRENE: ICD-10-CM

## 2021-06-30 ENCOUNTER — TELEPHONE (OUTPATIENT)
Dept: FAMILY MEDICINE | Facility: CLINIC | Age: 86
End: 2021-06-30

## 2021-06-30 RX ORDER — AMLODIPINE BESYLATE 2.5 MG/1
2.5 TABLET ORAL DAILY
Qty: 90 TABLET | Refills: 0 | Status: SHIPPED | OUTPATIENT
Start: 2021-06-30 | End: 2021-08-23

## 2021-06-30 NOTE — TELEPHONE ENCOUNTER
Patient Quality Outreach      Summary:    Patient has the following on her problem list/HM:     Asthma review       ACT Total Scores 11/5/2019   ACT TOTAL SCORE -   ASTHMA ER VISITS -   ASTHMA HOSPITALIZATIONS -   ACT TOTAL SCORE (Goal Greater than or Equal to 20) 23   In the past 12 months, how many times did you visit the emergency room for your asthma without being admitted to the hospital? 0   In the past 12 months, how many times were you hospitalized overnight because of your asthma? 0          Patient is due/failing the following:   ACT needed and Annual wellness, date due: 10/22/2019    Type of outreach:    Sent letter.    Questions for provider review:    None                                                                                                                                     Nas KING, CMA

## 2021-06-30 NOTE — LETTER
June 30, 2021      Kylah Carrasquillo  [address] [place] [address]        Dear Kylah,    I care about your health and have reviewed your health plan. I have reviewed your medical conditions, medication list, and lab results and am making recommendations based on this review, to better manage your health.    You are in particular need of attention regarding:  -Asthma  -Wellness (Physical) Visit     I am recommending that you:  -schedule a WELLNESS (Physical) APPOINTMENT with me.   I will check fasting labs the same day - nothing to eat except water and meds for 8-10 hours prior.    Here is a list of Health Maintenance topics that are due now or due soon:  Health Maintenance Due   Topic Date Due     ANNUAL REVIEW OF HM ORDERS  Never done     Zoster (Shingles) Vaccine (2 of 3) 12/11/2013     Annual Wellness Visit  10/22/2019     FALL RISK ASSESSMENT  10/22/2019     Asthma Control Test  05/05/2020     Asthma Action Plan - yearly  11/05/2020     PHQ-2  01/01/2021       Please call us at [phone] (or use [employer]) to address the above recommendations.     Thank you for trusting [facility] and we appreciate the opportunity to serve you.  We look forward to supporting your healthcare needs in the future.    Healthy Regards,    Azucena Almanzar MD

## 2021-08-21 DIAGNOSIS — I73.00 RAYNAUD'S DISEASE WITHOUT GANGRENE: ICD-10-CM

## 2021-08-21 NOTE — LETTER
August 31, 2021      Kylah Carrasquillo  [address] [address] [address]          Dear Ms. Carrasquillo,    Your physician requires an office visit in order to monitor your maintenance medication(s).  Your last office visit was on 8/13/20.  We have faxed to the pharmacy a 3 month refill of your medication(s) until you can be seen by your provider.  Please call the clinic at [phone] to schedule an appointment.        Sincerely,    [facility]

## 2021-08-23 RX ORDER — AMLODIPINE BESYLATE 2.5 MG/1
TABLET ORAL
Qty: 90 TABLET | Refills: 0 | Status: SHIPPED | OUTPATIENT
Start: 2021-08-23

## 2021-08-23 NOTE — TELEPHONE ENCOUNTER
Patient due for fasting office visit- 90 days supply given.  Routing to team to schedule appointment     Margo SMITH RN  [facility]  [phone]

## 2021-09-25 ENCOUNTER — HEALTH MAINTENANCE LETTER (OUTPATIENT)
Age: 86
End: 2021-09-25

## 2022-01-15 ENCOUNTER — HEALTH MAINTENANCE LETTER (OUTPATIENT)
Age: 87
End: 2022-01-15

## 2022-12-26 ENCOUNTER — HEALTH MAINTENANCE LETTER (OUTPATIENT)
Age: 87
End: 2022-12-26

## 2024-02-04 ENCOUNTER — HEALTH MAINTENANCE LETTER (OUTPATIENT)
Age: 89
End: 2024-02-04